# Patient Record
Sex: MALE | NOT HISPANIC OR LATINO | Employment: OTHER | URBAN - NONMETROPOLITAN AREA
[De-identification: names, ages, dates, MRNs, and addresses within clinical notes are randomized per-mention and may not be internally consistent; named-entity substitution may affect disease eponyms.]

---

## 2017-08-21 PROBLEM — I48.0 PAF (PAROXYSMAL ATRIAL FIBRILLATION): Status: ACTIVE | Noted: 2017-08-21

## 2017-08-24 ENCOUNTER — CONSULT (OUTPATIENT)
Dept: CARDIOLOGY | Facility: CLINIC | Age: 77
End: 2017-08-24

## 2017-08-24 VITALS
BODY MASS INDEX: 36.61 KG/M2 | WEIGHT: 255.7 LBS | DIASTOLIC BLOOD PRESSURE: 86 MMHG | HEIGHT: 70 IN | SYSTOLIC BLOOD PRESSURE: 130 MMHG | HEART RATE: 86 BPM

## 2017-08-24 DIAGNOSIS — I42.9 CARDIOMYOPATHY (HCC): ICD-10-CM

## 2017-08-24 DIAGNOSIS — R00.2 PALPITATIONS: Primary | ICD-10-CM

## 2017-08-24 DIAGNOSIS — I50.23 ACUTE ON CHRONIC SYSTOLIC HEART FAILURE (HCC): ICD-10-CM

## 2017-08-24 DIAGNOSIS — I48.0 PAF (PAROXYSMAL ATRIAL FIBRILLATION) (HCC): ICD-10-CM

## 2017-08-24 PROCEDURE — 99203 OFFICE O/P NEW LOW 30 MIN: CPT | Performed by: INTERNAL MEDICINE

## 2017-08-24 PROCEDURE — 93000 ELECTROCARDIOGRAM COMPLETE: CPT | Performed by: INTERNAL MEDICINE

## 2017-08-24 RX ORDER — RANOLAZINE 1000 MG/1
1000 TABLET, EXTENDED RELEASE ORAL 2 TIMES DAILY
COMMUNITY
End: 2018-01-15 | Stop reason: ALTCHOICE

## 2017-08-24 RX ORDER — GLIMEPIRIDE 2 MG/1
2 TABLET ORAL
COMMUNITY
End: 2022-02-24

## 2017-08-24 RX ORDER — FINASTERIDE 5 MG/1
5 TABLET, FILM COATED ORAL EVERY MORNING
COMMUNITY

## 2017-08-24 RX ORDER — FOLIC ACID 1 MG/1
1 TABLET ORAL EVERY MORNING
COMMUNITY
End: 2020-12-10

## 2017-08-24 RX ORDER — GABAPENTIN 800 MG/1
800 TABLET ORAL NIGHTLY
COMMUNITY
End: 2018-06-28

## 2017-08-24 RX ORDER — CARVEDILOL 6.25 MG/1
18.75 TABLET ORAL 2 TIMES DAILY WITH MEALS
Status: ON HOLD | COMMUNITY
End: 2017-09-27

## 2017-08-24 RX ORDER — WARFARIN SODIUM 6 MG/1
6 TABLET ORAL
COMMUNITY
End: 2023-03-23

## 2017-08-24 RX ORDER — AMIODARONE HYDROCHLORIDE 200 MG/1
200 TABLET ORAL DAILY
Status: ON HOLD | COMMUNITY
End: 2017-09-27 | Stop reason: ALTCHOICE

## 2017-08-24 NOTE — PROGRESS NOTES
Subjective:   Omero Holland  1940  [phone]      08/24/2017    [facility] CARDIOLOGY    No Known Provider  [facility] [address]    REFERRING DOCTOR: Dr. Melendez      Patient ID: Omero Holland is a 76 y.o. male.    Chief Complaint:   Chief Complaint   Patient presents with   • Chest Pain   • Shortness of Breath   • Irregular Heart Beat       Problem List   1. Mixed CM        A)CM dating back to 2007 EF 30% by [facility]       B)[facility] ostial CX BMS 2016        C)MPS 2/17 EF 25% Inferior ischemia        D)C LAD BMS 3/17        E)Echocardiogram EF 35% 5/17        F)Echocardiogram EF 25% 6/16/17        G)MUGA  7/25/17 EF 35%        H)Chronic class III SHF with recent admission to [facility] in setting of PAF with RVR, invitation of        Amiodarone and diuresis.  Cozaar stopped secondary to Hypotension and kidney issues    2. Hx of remote Aflutter RFA 2013   3. Frequent PVC's/LBBB   4. HTN   5. HLD   6. LOIS/CPAP   7. BPH/Elevated PSA followed by Dr. Flynn   8. Hypothyroidism              Social History     Social History   • Marital status: Unknown     Spouse name: N/A   • Number of children: N/A   • Years of education: N/A     Occupational History   • Not on file.     Social History Main Topics   • Smoking status: Never Smoker   • Smokeless tobacco: Not on file   • Alcohol use Not on file   • Drug use: Not on file   • Sexual activity: Defer     Other Topics Concern   • Not on file     Social History Narrative         Allergies   Allergen Reactions   • Codeine      Past Surgical History:   Procedure Laterality Date   • CARDIAC CATHETERIZATION Left 2007   • OTHER SURGICAL HISTORY      Melanoma removal of back    • OTHER SURGICAL HISTORY      Left axillary lumpectomy   • REPLACEMENT TOTAL KNEE BILATERAL       FAMILY HISTORY:  Coronary disease diabetes hypertension as well as atrial arrhythmias.  All is reviewed and no other significant with the patient's  history.      Current Outpatient Prescriptions:   •  amiodarone (PACERONE) 200 MG tablet, Take 200 mg by mouth Daily., Disp: , Rfl:   •  carvedilol (COREG) 6.25 MG tablet, Take 18.75 mg by mouth 2 (Two) Times a Day With Meals., Disp: , Rfl:   •  finasteride (PROSCAR) 5 MG tablet, Take 5 mg by mouth Daily., Disp: , Rfl:   •  folic acid (FOLVITE) 1 MG tablet, Take 1 mg by mouth Daily., Disp: , Rfl:   •  gabapentin (NEURONTIN) 800 MG tablet, Take 800 mg by mouth Daily., Disp: , Rfl:   •  glimepiride (AMARYL) 2 MG tablet, Take 2 mg by mouth Every Morning Before Breakfast., Disp: , Rfl:   •  metFORMIN (GLUCOPHAGE) 1000 MG tablet, Take 1,000 mg by mouth Daily With Breakfast., Disp: , Rfl:   •  methotrexate 2.5 MG tablet, Take  by mouth 3 (Three) Doses Each Week. Take Doses 12 (Twelve) Hours Apart. 8 tabs weekly, Disp: , Rfl:   •  ranolazine (RANEXA) 1000 MG 12 hr tablet, Take 1,000 mg by mouth 2 (Two) Times a Day., Disp: , Rfl:   •  warfarin (COUMADIN) 6 MG tablet, Take 6 mg by mouth Daily., Disp: , Rfl:     History of Present Illness  The patient is a pleasant 76 year old white male seen in consult for CM, SHF, LBBB, PAF, and Frequent PVC's from Dr. Chucho Melendez. He recently was admitted to [facility] for Acute on chronic SHF. He was started on Amiodarone for PAF and he converted to Sinus with frequent PVC's.  He was diuresed over twenty pounds of fluid.  He states he feels better but cannot walk more than a few feet without SOB and Fatigue.  He denies any near syncope or synocope.  He presents for consideration for BIVICD.  Seems the patient has had a decreased ejection fraction at least since February 2017 with at that time 32%.  Echocardiogram in June 2017 showed ejection fraction 25%.  And now most recently MUGA scan in July 2017 showed ejection fraction of 35%.  He is functional class III at the present time with even minimal activity causes him to be short of breath.  He's been dealing with recurrent heart failure episodes  "as well.  When he was admitted to the hospital earlier this month he came in for increasing shortness of breath and smothering feeling.  He lost about 15 pounds was just diuresis.  Also at that time was dealing with some kidney issues and saw a nephrologist.  I think the losartan was stopped due to hypotension as well as kidney issues at that time.  As noted above he was sent here for consideration for a biventricular ICD due to continued ejection fraction less than or equal to 35% since at least February 2017 on optimal medical therapy.        The following portions of the patient's history were reviewed and updated as appropriate: allergies, current medications, past family history, past medical history, past social history, past surgical history and problem list.    ROS   14 point ROS negative except as outlined in problem list, HPI and other parts of the note.      ECG 12 Lead  Date/Time: 8/24/2017 10:33 AM  Performed by: TANIA BIRCH  Authorized by: TANIA BIRCH   Rhythm: sinus rhythm  Ectopy: PVCs and atrial premature contractions  Conduction: complete LBBB and 1st degree  Comments: Sinus rhythm with a first-degree AV block and PACs and PVCs noted.  QRS duration of about 160 ms left bundle branch block.               Objective:       Vitals:    08/24/17 0937   BP: 130/86   BP Location: Right arm   Patient Position: Sitting   Pulse: 86   Weight: 255 lb 11.2 oz (116 kg)   Height: 70\" (177.8 cm)       GENERAL: Well-developed, well-nourished patient in no acute distress.  HEENT: Normocephalic, atraumatic, PERRLA.   NECK: No JVD present at 30°. No carotid bruits auscultated.  LUNGS: Clear to auscultation with decreased at the bases  CARDIOVASCULAR: IRIR. No murmurs, gallops or rubs noted.   ABDOMEN: Soft, nontender. Positive bowel sounds.  MUSCULOSKELETAL: No gross deformities. No clubbing, cyanosis, or lower extremity edema.  SKIN: Pink, warm  Neuro: Nonfocal exam. Gait intact  Ext: two plus edema  The " patient's old records including ambulatory rhythm recordings (ECGs, Holter/event monitor) were reviewed and discussed.      Lab Review:   No results found for this or any previous visit.        Diagnosis:   1. Cardiomyopathy EF 32%. Mixed.  2. PAF: recent Amiodarone therapy 8/17.Chronic Coumadin therapy.   3. Class III SHF  4. LBBB  5. PVC's    Assessment & Plan:   1.  Mixed cardiomyopathy with ejection fraction less than or equal to 30-35% since February 2017 on tolerable optimal medical therapy.  Left bundle branch block QRS duration 160 ms with functional class III symptoms.  He was recently admitted to the hospital at [place] for acute on chronic systolic heart failure and significant diuresis as needed.  Also at that time questionable worsening kidney function and losartan has been stopped due to that as well as hypotension.  Otherwise, he has been on optimal medical therapy that he is been tolerant for greater than 90 days.  2.  History of atrial flutter status post ablation in the past now with paroxysmal atrial fibrillation with recent amiodarone therapy at the hospitalization in August.  Also maintained on Coumadin.  3 PACs/PVCs.    Plan:  1.  I think patient would be best served with proceeding with a biventricular ICD with the patient's continued ejection fraction of 30-35% since February 2017 at least on tolerable optimal medical therapy.  Also with a left bundle branch block QRS duration 160 ms and functional class III symptoms.  I explained this to the patient and his wife at length and they fully understand.  2.  History of atrial flutter now with recurrent paroxysmal atrial fibrillation maintained on amiodarone and Coumadin.  We will continue these for now.  3.  I will get the lab results from his nephrologist office  and if creatinine seems to be improved with a increased blood pressure now would restart back on losartan.  They have tried Entresto in the past however the patient cannot  afford.  4.  Continue with current medical therapy and will see the patient follow-up at the time of his biventricular ICD implantation. The risks, benefits, and alternatives of the procedure have been reviewed and the patient wishes to proceed.              BOOGIE Valdes scribe for Dr. Leroy   08/24/17  10:11 AM     I, Evan Leroy DO, personally performed the services described in this documentation as scribed by the above named individual in my presence, and it is both accurate and complete.  8/24/2017  10:38 AM    Evan Leroy DO  10:38 AM  08/24/17

## 2017-09-25 ENCOUNTER — PREP FOR SURGERY (OUTPATIENT)
Dept: OTHER | Facility: HOSPITAL | Age: 77
End: 2017-09-25

## 2017-09-25 DIAGNOSIS — I42.9 CARDIOMYOPATHY (HCC): Primary | ICD-10-CM

## 2017-09-25 RX ORDER — CEFAZOLIN SODIUM 2 G/100ML
2 INJECTION, SOLUTION INTRAVENOUS
Status: CANCELLED | OUTPATIENT
Start: 2017-09-26 | End: 2017-09-27

## 2017-09-25 RX ORDER — SODIUM CHLORIDE 0.9 % (FLUSH) 0.9 %
1-10 SYRINGE (ML) INJECTION AS NEEDED
Status: CANCELLED | OUTPATIENT
Start: 2017-09-25

## 2017-09-26 ENCOUNTER — APPOINTMENT (OUTPATIENT)
Dept: PREADMISSION TESTING | Facility: HOSPITAL | Age: 77
End: 2017-09-26

## 2017-09-26 DIAGNOSIS — I42.9 CARDIOMYOPATHY (HCC): ICD-10-CM

## 2017-09-26 LAB
ANION GAP SERPL CALCULATED.3IONS-SCNC: 5 MMOL/L (ref 3–11)
BUN BLD-MCNC: 19 MG/DL (ref 9–23)
BUN/CREAT SERPL: 17.3 (ref 7–25)
CALCIUM SPEC-SCNC: 9.5 MG/DL (ref 8.7–10.4)
CHLORIDE SERPL-SCNC: 102 MMOL/L (ref 99–109)
CO2 SERPL-SCNC: 28 MMOL/L (ref 20–31)
CREAT BLD-MCNC: 1.1 MG/DL (ref 0.6–1.3)
DEPRECATED RDW RBC AUTO: 56.5 FL (ref 37–54)
ERYTHROCYTE [DISTWIDTH] IN BLOOD BY AUTOMATED COUNT: 16.1 % (ref 11.3–14.5)
GFR SERPL CREATININE-BSD FRML MDRD: 65 ML/MIN/1.73
GFR SERPL CREATININE-BSD FRML MDRD: 79 ML/MIN/1.73
GLUCOSE BLD-MCNC: 174 MG/DL (ref 70–100)
HCT VFR BLD AUTO: 39.8 % (ref 38.9–50.9)
HGB BLD-MCNC: 12.9 G/DL (ref 13.1–17.5)
INR PPP: 1.59
MAGNESIUM SERPL-MCNC: 2 MG/DL (ref 1.3–2.7)
MCH RBC QN AUTO: 31.2 PG (ref 27–31)
MCHC RBC AUTO-ENTMCNC: 32.4 G/DL (ref 32–36)
MCV RBC AUTO: 96.1 FL (ref 80–99)
PLATELET # BLD AUTO: 160 10*3/MM3 (ref 150–450)
PMV BLD AUTO: 10.5 FL (ref 6–12)
POTASSIUM BLD-SCNC: 4.1 MMOL/L (ref 3.5–5.5)
PROTHROMBIN TIME: 17.6 SECONDS (ref 9.6–11.5)
RBC # BLD AUTO: 4.14 10*6/MM3 (ref 4.2–5.76)
SODIUM BLD-SCNC: 135 MMOL/L (ref 132–146)
WBC NRBC COR # BLD: 8.04 10*3/MM3 (ref 3.5–10.8)

## 2017-09-26 PROCEDURE — 83735 ASSAY OF MAGNESIUM: CPT | Performed by: PHYSICIAN ASSISTANT

## 2017-09-26 PROCEDURE — 85610 PROTHROMBIN TIME: CPT | Performed by: PHYSICIAN ASSISTANT

## 2017-09-26 PROCEDURE — 80048 BASIC METABOLIC PNL TOTAL CA: CPT | Performed by: PHYSICIAN ASSISTANT

## 2017-09-26 PROCEDURE — 85027 COMPLETE CBC AUTOMATED: CPT | Performed by: PHYSICIAN ASSISTANT

## 2017-09-26 PROCEDURE — 36415 COLL VENOUS BLD VENIPUNCTURE: CPT

## 2017-09-26 NOTE — DISCHARGE INSTRUCTIONS
The following instructions given during Pre Admission Testing visit:    Do not eat or drink anything after MN except for sips of water with your a.m. Prescription meds unless otherwise instructed by your physician.    Glasses and jewelry may be worn, but dentures must be removed prior to cath/procedure.    Leave any items you consider valuable at home.    Family members may wait in [facility] waiting area during procedure.    Bring all medications in their original containers the day of procedure.    Bring photo ID and insurance cards on the day of procedure.    Need to make arrangements for transportation prior to discharge.    The following handouts were given:     Heart Cath pathway (if applicable)   Cardiac Cath booklet published by [facility]    OR appropriate [facility] procedure booklet    If applicable, pt instructed to bring CPAP mask and tubing the day of procedure.  ICD and chlorhexadine wipes given

## 2017-09-27 ENCOUNTER — APPOINTMENT (OUTPATIENT)
Dept: GENERAL RADIOLOGY | Facility: HOSPITAL | Age: 77
End: 2017-09-27

## 2017-09-27 ENCOUNTER — HOSPITAL ENCOUNTER (OUTPATIENT)
Facility: HOSPITAL | Age: 77
Discharge: HOME | End: 2017-09-28
Attending: INTERNAL MEDICINE | Admitting: INTERNAL MEDICINE

## 2017-09-27 DIAGNOSIS — I42.9 CARDIOMYOPATHY (HCC): ICD-10-CM

## 2017-09-27 LAB
GLUCOSE BLDC GLUCOMTR-MCNC: 138 MG/DL (ref 70–130)
GLUCOSE BLDC GLUCOMTR-MCNC: 163 MG/DL (ref 70–130)
GLUCOSE BLDC GLUCOMTR-MCNC: 181 MG/DL (ref 70–130)
GLUCOSE BLDC GLUCOMTR-MCNC: 207 MG/DL (ref 70–130)

## 2017-09-27 PROCEDURE — C1892 INTRO/SHEATH,FIXED,PEEL-AWAY: HCPCS | Performed by: INTERNAL MEDICINE

## 2017-09-27 PROCEDURE — 33225 L VENTRIC PACING LEAD ADD-ON: CPT | Performed by: INTERNAL MEDICINE

## 2017-09-27 PROCEDURE — 25010000002 FENTANYL CITRATE (PF) 100 MCG/2ML SOLUTION: Performed by: INTERNAL MEDICINE

## 2017-09-27 PROCEDURE — C1730 CATH, EP, 19 OR FEW ELECT: HCPCS | Performed by: INTERNAL MEDICINE

## 2017-09-27 PROCEDURE — 93641 EP EVL 1/2CHMB PAC CVDFB TST: CPT | Performed by: INTERNAL MEDICINE

## 2017-09-27 PROCEDURE — 82962 GLUCOSE BLOOD TEST: CPT

## 2017-09-27 PROCEDURE — C1900 LEAD, CORONARY VENOUS: HCPCS | Performed by: INTERNAL MEDICINE

## 2017-09-27 PROCEDURE — 25010000002 ONDANSETRON PER 1 MG: Performed by: INTERNAL MEDICINE

## 2017-09-27 PROCEDURE — 33249 INSJ/RPLCMT DEFIB W/LEAD(S): CPT | Performed by: INTERNAL MEDICINE

## 2017-09-27 PROCEDURE — 94799 UNLISTED PULMONARY SVC/PX: CPT

## 2017-09-27 PROCEDURE — G0378 HOSPITAL OBSERVATION PER HR: HCPCS

## 2017-09-27 PROCEDURE — C1882 AICD, OTHER THAN SING/DUAL: HCPCS | Performed by: INTERNAL MEDICINE

## 2017-09-27 PROCEDURE — 25010000002 MIDAZOLAM PER 1 MG: Performed by: INTERNAL MEDICINE

## 2017-09-27 PROCEDURE — 94660 CPAP INITIATION&MGMT: CPT

## 2017-09-27 PROCEDURE — 0 IOPAMIDOL PER 1 ML: Performed by: INTERNAL MEDICINE

## 2017-09-27 PROCEDURE — C1769 GUIDE WIRE: HCPCS | Performed by: INTERNAL MEDICINE

## 2017-09-27 PROCEDURE — 63710000001 INSULIN LISPRO (HUMAN) PER 5 UNITS: Performed by: PHYSICIAN ASSISTANT

## 2017-09-27 PROCEDURE — 71020 HC CHEST PA AND LATERAL: CPT

## 2017-09-27 PROCEDURE — 25010000003 CEFAZOLIN IN DEXTROSE 2-4 GM/100ML-% SOLUTION: Performed by: PHYSICIAN ASSISTANT

## 2017-09-27 PROCEDURE — C1898 LEAD, PMKR, OTHER THAN TRANS: HCPCS | Performed by: INTERNAL MEDICINE

## 2017-09-27 PROCEDURE — C1887 CATHETER, GUIDING: HCPCS | Performed by: INTERNAL MEDICINE

## 2017-09-27 PROCEDURE — C1777 LEAD, AICD, ENDO SINGLE COIL: HCPCS | Performed by: INTERNAL MEDICINE

## 2017-09-27 DEVICE — IMPLANTABLE DEVICE: Type: IMPLANTABLE DEVICE | Site: HEART | Status: FUNCTIONAL

## 2017-09-27 DEVICE — PACE/SENSE LEAD
Type: IMPLANTABLE DEVICE | Site: HEART | Status: FUNCTIONAL
Brand: ACUITY™ X4 SPIRAL L

## 2017-09-27 DEVICE — CARDIAC RESYNCHRONIZATION THERAPY DEFIBRILLATOR
Type: IMPLANTABLE DEVICE | Site: CHEST WALL | Status: FUNCTIONAL
Brand: INOGEN™ X4 CRT-D

## 2017-09-27 DEVICE — STEROID-ELUTING BIPOLAR PACE/SENSE AND DEFIBRILLATION LEAD
Type: IMPLANTABLE DEVICE | Site: HEART | Status: FUNCTIONAL
Brand: ENDOTAK RELIANCE® SG

## 2017-09-27 RX ORDER — CEFAZOLIN SODIUM 2 G/100ML
2 INJECTION, SOLUTION INTRAVENOUS
Status: COMPLETED | OUTPATIENT
Start: 2017-09-27 | End: 2017-09-27

## 2017-09-27 RX ORDER — TORSEMIDE 20 MG/1
50 TABLET ORAL 2 TIMES DAILY
Status: DISCONTINUED | OUTPATIENT
Start: 2017-09-27 | End: 2017-09-28 | Stop reason: HOSPADM

## 2017-09-27 RX ORDER — ONDANSETRON 2 MG/ML
INJECTION INTRAMUSCULAR; INTRAVENOUS AS NEEDED
Status: DISCONTINUED | OUTPATIENT
Start: 2017-09-27 | End: 2017-09-27 | Stop reason: HOSPADM

## 2017-09-27 RX ORDER — ONDANSETRON 2 MG/ML
4 INJECTION INTRAMUSCULAR; INTRAVENOUS EVERY 6 HOURS PRN
Status: DISCONTINUED | OUTPATIENT
Start: 2017-09-27 | End: 2017-09-28 | Stop reason: HOSPADM

## 2017-09-27 RX ORDER — SODIUM CHLORIDE 9 MG/ML
INJECTION, SOLUTION INTRAVENOUS CONTINUOUS PRN
Status: DISCONTINUED | OUTPATIENT
Start: 2017-09-27 | End: 2017-09-27 | Stop reason: HOSPADM

## 2017-09-27 RX ORDER — MIDAZOLAM HYDROCHLORIDE 1 MG/ML
INJECTION INTRAMUSCULAR; INTRAVENOUS AS NEEDED
Status: DISCONTINUED | OUTPATIENT
Start: 2017-09-27 | End: 2017-09-27 | Stop reason: HOSPADM

## 2017-09-27 RX ORDER — ACETAMINOPHEN 325 MG/1
650 TABLET ORAL EVERY 4 HOURS PRN
Status: DISCONTINUED | OUTPATIENT
Start: 2017-09-27 | End: 2017-09-28 | Stop reason: HOSPADM

## 2017-09-27 RX ORDER — WARFARIN SODIUM 3 MG/1
6 TABLET ORAL
Status: DISCONTINUED | OUTPATIENT
Start: 2017-09-28 | End: 2017-09-28 | Stop reason: HOSPADM

## 2017-09-27 RX ORDER — LIDOCAINE HYDROCHLORIDE 10 MG/ML
INJECTION, SOLUTION INFILTRATION; PERINEURAL AS NEEDED
Status: DISCONTINUED | OUTPATIENT
Start: 2017-09-27 | End: 2017-09-27 | Stop reason: HOSPADM

## 2017-09-27 RX ORDER — RANOLAZINE 500 MG/1
1000 TABLET, EXTENDED RELEASE ORAL EVERY 12 HOURS SCHEDULED
Status: DISCONTINUED | OUTPATIENT
Start: 2017-09-27 | End: 2017-09-28 | Stop reason: HOSPADM

## 2017-09-27 RX ORDER — FINASTERIDE 5 MG/1
5 TABLET, FILM COATED ORAL EVERY MORNING
Status: DISCONTINUED | OUTPATIENT
Start: 2017-09-27 | End: 2017-09-28 | Stop reason: HOSPADM

## 2017-09-27 RX ORDER — CARVEDILOL 12.5 MG/1
12.5 TABLET ORAL 2 TIMES DAILY WITH MEALS
COMMUNITY

## 2017-09-27 RX ORDER — SODIUM CHLORIDE 0.9 % (FLUSH) 0.9 %
1-10 SYRINGE (ML) INJECTION AS NEEDED
Status: DISCONTINUED | OUTPATIENT
Start: 2017-09-27 | End: 2017-09-28 | Stop reason: HOSPADM

## 2017-09-27 RX ORDER — SODIUM CHLORIDE 0.9 % (FLUSH) 0.9 %
1-10 SYRINGE (ML) INJECTION AS NEEDED
Status: DISCONTINUED | OUTPATIENT
Start: 2017-09-27 | End: 2017-09-27 | Stop reason: HOSPADM

## 2017-09-27 RX ORDER — NICOTINE POLACRILEX 4 MG
15 LOZENGE BUCCAL
Status: DISCONTINUED | OUTPATIENT
Start: 2017-09-27 | End: 2017-09-28 | Stop reason: HOSPADM

## 2017-09-27 RX ORDER — WARFARIN SODIUM 3 MG/1
6 TABLET ORAL
Status: DISCONTINUED | OUTPATIENT
Start: 2017-09-27 | End: 2017-09-27

## 2017-09-27 RX ORDER — DEXTROSE MONOHYDRATE 25 G/50ML
25 INJECTION, SOLUTION INTRAVENOUS
Status: DISCONTINUED | OUTPATIENT
Start: 2017-09-27 | End: 2017-09-28 | Stop reason: HOSPADM

## 2017-09-27 RX ORDER — TORSEMIDE 100 MG/1
50 TABLET ORAL 2 TIMES DAILY
COMMUNITY

## 2017-09-27 RX ORDER — BUPIVACAINE HYDROCHLORIDE 5 MG/ML
INJECTION, SOLUTION EPIDURAL; INTRACAUDAL AS NEEDED
Status: DISCONTINUED | OUTPATIENT
Start: 2017-09-27 | End: 2017-09-27 | Stop reason: HOSPADM

## 2017-09-27 RX ORDER — FENTANYL CITRATE 50 UG/ML
INJECTION, SOLUTION INTRAMUSCULAR; INTRAVENOUS AS NEEDED
Status: DISCONTINUED | OUTPATIENT
Start: 2017-09-27 | End: 2017-09-27 | Stop reason: HOSPADM

## 2017-09-27 RX ADMIN — INSULIN LISPRO 2 UNITS: 100 INJECTION, SOLUTION INTRAVENOUS; SUBCUTANEOUS at 20:46

## 2017-09-27 RX ADMIN — CEFAZOLIN SODIUM 2 G: 2 INJECTION, SOLUTION INTRAVENOUS at 09:11

## 2017-09-27 RX ADMIN — RANOLAZINE 1000 MG: 500 TABLET, FILM COATED, EXTENDED RELEASE ORAL at 20:46

## 2017-09-27 RX ADMIN — SACUBITRIL AND VALSARTAN 1 TABLET: 24; 26 TABLET, FILM COATED ORAL at 22:42

## 2017-09-27 RX ADMIN — TORSEMIDE 50 MG: 20 TABLET ORAL at 18:31

## 2017-09-27 RX ADMIN — CARVEDILOL 18.75 MG: 12.5 TABLET, FILM COATED ORAL at 18:31

## 2017-09-28 ENCOUNTER — APPOINTMENT (OUTPATIENT)
Dept: GENERAL RADIOLOGY | Facility: HOSPITAL | Age: 77
End: 2017-09-28

## 2017-09-28 VITALS
OXYGEN SATURATION: 99 % | TEMPERATURE: 98.8 F | WEIGHT: 262.13 LBS | SYSTOLIC BLOOD PRESSURE: 117 MMHG | RESPIRATION RATE: 20 BRPM | DIASTOLIC BLOOD PRESSURE: 85 MMHG | HEART RATE: 86 BPM | BODY MASS INDEX: 36.7 KG/M2 | HEIGHT: 71 IN

## 2017-09-28 LAB
GLUCOSE BLDC GLUCOMTR-MCNC: 177 MG/DL (ref 70–130)
INR PPP: 1.54
PROTHROMBIN TIME: 17 SECONDS (ref 9.6–11.5)

## 2017-09-28 PROCEDURE — 85610 PROTHROMBIN TIME: CPT | Performed by: PHYSICIAN ASSISTANT

## 2017-09-28 PROCEDURE — 71020 HC CHEST PA AND LATERAL: CPT

## 2017-09-28 PROCEDURE — G0378 HOSPITAL OBSERVATION PER HR: HCPCS

## 2017-09-28 PROCEDURE — 99024 POSTOP FOLLOW-UP VISIT: CPT | Performed by: INTERNAL MEDICINE

## 2017-09-28 PROCEDURE — 82962 GLUCOSE BLOOD TEST: CPT

## 2017-09-28 PROCEDURE — 93005 ELECTROCARDIOGRAM TRACING: CPT | Performed by: INTERNAL MEDICINE

## 2017-09-28 RX ADMIN — FINASTERIDE 5 MG: 5 TABLET, FILM COATED ORAL at 08:25

## 2017-09-28 RX ADMIN — RANOLAZINE 1000 MG: 500 TABLET, FILM COATED, EXTENDED RELEASE ORAL at 08:24

## 2017-09-28 RX ADMIN — INSULIN LISPRO 2 UNITS: 100 INJECTION, SOLUTION INTRAVENOUS; SUBCUTANEOUS at 08:23

## 2017-09-28 RX ADMIN — FINASTERIDE 5 MG: 5 TABLET, FILM COATED ORAL at 05:12

## 2017-09-28 RX ADMIN — CARVEDILOL 18.75 MG: 12.5 TABLET, FILM COATED ORAL at 08:24

## 2017-10-05 ENCOUNTER — OFFICE VISIT (OUTPATIENT)
Dept: CARDIOLOGY | Facility: CLINIC | Age: 77
End: 2017-10-05

## 2017-10-05 DIAGNOSIS — I42.8 OTHER CARDIOMYOPATHY (HCC): Primary | ICD-10-CM

## 2017-10-05 PROCEDURE — 99024 POSTOP FOLLOW-UP VISIT: CPT | Performed by: INTERNAL MEDICINE

## 2018-01-15 ENCOUNTER — OFFICE VISIT (OUTPATIENT)
Dept: CARDIOLOGY | Facility: CLINIC | Age: 78
End: 2018-01-15

## 2018-01-15 VITALS
HEIGHT: 69 IN | BODY MASS INDEX: 38.54 KG/M2 | WEIGHT: 260.2 LBS | DIASTOLIC BLOOD PRESSURE: 80 MMHG | SYSTOLIC BLOOD PRESSURE: 110 MMHG

## 2018-01-15 DIAGNOSIS — I50.22 CHRONIC SYSTOLIC HEART FAILURE (HCC): ICD-10-CM

## 2018-01-15 DIAGNOSIS — I10 ESSENTIAL HYPERTENSION: ICD-10-CM

## 2018-01-15 DIAGNOSIS — I48.0 PAF (PAROXYSMAL ATRIAL FIBRILLATION) (HCC): Primary | ICD-10-CM

## 2018-01-15 DIAGNOSIS — Z95.810 ICD (IMPLANTABLE CARDIOVERTER-DEFIBRILLATOR) IN PLACE: ICD-10-CM

## 2018-01-15 PROCEDURE — 99213 OFFICE O/P EST LOW 20 MIN: CPT | Performed by: INTERNAL MEDICINE

## 2018-01-15 PROCEDURE — 93284 PRGRMG EVAL IMPLANTABLE DFB: CPT | Performed by: INTERNAL MEDICINE

## 2018-01-15 RX ORDER — AMIODARONE HYDROCHLORIDE 200 MG/1
200 TABLET ORAL DAILY
COMMUNITY
End: 2018-01-15 | Stop reason: SDUPTHER

## 2018-01-15 RX ORDER — ATORVASTATIN CALCIUM 40 MG/1
40 TABLET, FILM COATED ORAL DAILY
COMMUNITY

## 2018-01-15 RX ORDER — AMIODARONE HYDROCHLORIDE 200 MG/1
TABLET ORAL
Qty: 50 TABLET | Refills: 0 | Status: SHIPPED | OUTPATIENT
Start: 2018-01-15 | End: 2021-06-24

## 2018-01-15 RX ORDER — ISOSORBIDE MONONITRATE 30 MG/1
30 TABLET, EXTENDED RELEASE ORAL DAILY
COMMUNITY
End: 2020-12-10

## 2018-01-15 NOTE — PROGRESS NOTES
Subjective:   Omero Holland  1940  [phone]      01/15/2018    [facility] CARDIOLOGY    Dimitrios Morales III, MD  [address] [address]    REFERRING DOCTOR: Dr Ayala      Patient ID: Omero Holland is a 77 y.o. male.    Chief Complaint:   Chief Complaint   Patient presents with   • Cardiomyopathy     Problem List:  1. Mixed CM        A)CM dating back to 2007 EF 30% by [facility]       B)[facility] ostial CX BMS 2016        C)MPS 2/17 EF 25% Inferior ischemia        D)C LAD BMS 3/17        E)Echocardiogram EF 35% 5/17        F)Echocardiogram EF 25% 6/16/17        G)MUGA  7/25/17 EF 35%        H)Chronic class III SHF with recent admission to [facility] in setting of PAF with RVR, invitation of Amiodarone and diuresis.  Cozaar stopped secondary to Hypotension and kidney issues       I ) s/p BIV ICD [employer]     2. Hx of remote Aflutter RFA 2013   3. Frequent PVC's/LBBB   4. HTN   5. HLD   6. LOIS/CPAP   7. BPH/Elevated PSA followed by Dr. Flynn   8. Hypothyroidism     Allergies   Allergen Reactions   • Codeine      Stomach pain       Current Outpatient Prescriptions:   •  amiodarone (PACERONE) 200 MG tablet, Take 200 mg by mouth Daily., Disp: , Rfl:   •  atorvastatin (LIPITOR) 40 MG tablet, Take 40 mg by mouth Daily., Disp: , Rfl:   •  carvedilol (COREG) 12.5 MG tablet, Take 18.75 mg by mouth 2 (Two) Times a Day With Meals., Disp: , Rfl:   •  finasteride (PROSCAR) 5 MG tablet, Take 5 mg by mouth Every Morning., Disp: , Rfl:   •  folic acid (FOLVITE) 1 MG tablet, Take 1 mg by mouth Every Morning., Disp: , Rfl:   •  gabapentin (NEURONTIN) 800 MG tablet, Take 800 mg by mouth Every Night., Disp: , Rfl:   •  glimepiride (AMARYL) 2 MG tablet, Take 2 mg by mouth Every Morning Before Breakfast., Disp: , Rfl:   •  isosorbide mononitrate (IMDUR) 30 MG 24 hr tablet, Take 30 mg by mouth Daily., Disp: , Rfl:   •  metFORMIN (GLUCOPHAGE) 1000 MG tablet, Take 1,000 mg by mouth  "Daily With Breakfast., Disp: , Rfl:   •  METHOTREXATE, ANTI-RHEUMATIC, PO, Take 2.5 mg by mouth 1 (One) Time Per Week. 8 tabs every Monday, Disp: , Rfl:   •  POTASSIUM CHLORIDE PO, Take 20 mEq by mouth 3 (Three) Times a Day., Disp: , Rfl:   •  torsemide (DEMADEX) 100 MG tablet, Take 50 mg by mouth 2 (Two) Times a Day., Disp: , Rfl:   •  warfarin (COUMADIN) 6 MG tablet, Take 6 mg by mouth Daily., Disp: , Rfl:     History of Present Illness  Patient here today for follow up with his Mixed CMP s/p BiV ICD in Sept 2017 PVCs and also increasing SOB. Following with nephrology for some issues with his kidney but now Cr 1.10. Overall still with sig SOB with any activity and also fatigue. Also dealing with LE swelling that is being followed as well. Never smoked. Occ with chest pain but more with fluid retention. Following with Dr Ayala.     No issues with  fevers, chills, night sweats, PND, orthopnea or palpitations. No recent ER visits or hospital stays.      The following portions of the patient's history were reviewed and updated as appropriate: allergies, current medications, past family history, past medical history, past social history, past surgical history and problem list.    ROS   14 point ROS negative except as outlined in problem list, HPI and other parts of the note.      ECG 12 Lead  Date/Time: 1/15/2018 3:35 PM  Performed by: TANIA BIRCH  Authorized by: TANIA BIRCH   Rhythm: sinus rhythm and paced  Comments: NSR BiV paced with Trigeminy PVCs mostly one morphology. RB morphology with superior axis.                Objective:       Vitals:    01/15/18 1516   BP: 110/80   BP Location: Left arm   Patient Position: Sitting   Weight: 118 kg (260 lb 3.2 oz)   Height: 174 cm (68.5\")       GENERAL: Well-developed, well-nourished patient in no acute distress.  HEENT: Normocephalic, atraumatic, PERRLA. Moist mucous membranes.  NECK: No JVD present at 30°. No carotid bruits auscultated.  LUNGS: Clear to " auscultation.  CARDIOVASCULAR: Heart has a regular rate and rhythm. + SNEHA noted. Ectopy  ABDOMEN: Soft, nontender. Positive bowel sounds.  MUSCULOSKELETAL: No gross deformities. No clubbing, cyanosis, or lower extremity edema.  SKIN: Pink, warm  Neuro: Nonfocal exam.   Ext:chronic swelling noted. 2+ > on right. Sores wrapped.   ICD site ok    The patient's old records including ambulatory rhythm recordings (ECGs, Holter/event monitor) were reviewed and discussed.      Lab Review:   Results for orders placed or performed during the hospital encounter of 09/27/17   Protime-INR   Result Value Ref Range    Protime 17.0 (H) 9.6 - 11.5 Seconds    INR 1.54    POC Glucose Fingerstick   Result Value Ref Range    Glucose 181 (H) 70 - 130 mg/dL   POC Glucose Fingerstick   Result Value Ref Range    Glucose 138 (H) 70 - 130 mg/dL   POC Glucose Fingerstick   Result Value Ref Range    Glucose 207 (H) 70 - 130 mg/dL   POC Glucose Fingerstick   Result Value Ref Range    Glucose 163 (H) 70 - 130 mg/dL   POC Glucose Fingerstick   Result Value Ref Range    Glucose 177 (H) 70 - 130 mg/dL     [employer] bi-V ICD.  A paced bi-V paced.  83% RA paced.  88% bi-V paced.  P wave 2.9 mV.  R waves 10.7 mV.  RA and RV lead threshold impedances noted.  RV lead threshold was increased to 3.5 V at 0.5 ms.  R waves are 10.7 mV.  LV lead threshold 1.5 old at 0.5 ms.  LV impedance 984 ohms.  6+ years left on battery.  RV output increased and RA output decreased.  LV output decreased as well.  No other significant changes made.  150,000 PVCs.        Diagnosis:   1. PAF (paroxysmal atrial fibrillation)  2. Chronic systolic heart failure  3. Essential hypertension  4. BiV ICD      Assessment & Plan:   1. Mixed cardiomyopathy w/ EF 30-35% since 2/2017 despite optimal rx, QRS > 150 msec and FC III s/p BiV ICD [employer] in Sept 2017  2. Remote Aflutter RFA -- in NSR now.   3. HTN stable.   4. BiV ICD with 88% biventricular paced.  Increased RV lead  threshold however still captures at higher thresholds and good sensing noted.  We'll continue monitoring RV lead closely.   5. PVCs noted with RBBB morphology and superior axis. Limited on what we can treat with due to his history and also with some issues with his kidneys in the past.     Plan:  1. Started on Amiodarone by Dr Ayala but not loaded. I want him to take 200 mg TID for 5 days then 200 mg BID for 10 days then 200 mg daily. Monitor liver, eyes, thyroid and lungs closely.   2. Watch RV lead closely and now will be biV paced  3. Continue on current optimal therapy, and continuing to follow for LE wound care issues. Diuresis per Dr Ayala's office.   4. Due to multiple comorbidities will be a higher risk for a PVC ablation and ideally would want to try medical therapy first and only as last resort consider Ep study , RFA.   5. Follow up in 3 mths with me in [place].         CC: Dr Jamie Leroy,   01/15/18  3:32 PM      EMR Dragon/Transcription disclaimer:  Much of this encounter note is an electronic transcription/translation of spoken language to printed text. Electronic translation of spoken language may permit erroneous, or at times, nonsensical words or phrases to be inadvertently transcribed. Although I have reviewed the note for such errors, some may still exist.

## 2018-06-28 ENCOUNTER — OFFICE VISIT (OUTPATIENT)
Dept: CARDIOLOGY | Facility: CLINIC | Age: 78
End: 2018-06-28

## 2018-06-28 VITALS
HEART RATE: 83 BPM | WEIGHT: 240 LBS | SYSTOLIC BLOOD PRESSURE: 122 MMHG | BODY MASS INDEX: 34.36 KG/M2 | DIASTOLIC BLOOD PRESSURE: 84 MMHG | HEIGHT: 70 IN

## 2018-06-28 DIAGNOSIS — I48.0 PAF (PAROXYSMAL ATRIAL FIBRILLATION) (HCC): Primary | ICD-10-CM

## 2018-06-28 DIAGNOSIS — I42.8 OTHER CARDIOMYOPATHY (HCC): ICD-10-CM

## 2018-06-28 DIAGNOSIS — I49.3 PVC (PREMATURE VENTRICULAR CONTRACTION): ICD-10-CM

## 2018-06-28 PROCEDURE — 93284 PRGRMG EVAL IMPLANTABLE DFB: CPT | Performed by: NURSE PRACTITIONER

## 2018-06-28 PROCEDURE — 99213 OFFICE O/P EST LOW 20 MIN: CPT | Performed by: NURSE PRACTITIONER

## 2018-06-28 RX ORDER — METOLAZONE 2.5 MG/1
2.5 TABLET ORAL AS NEEDED
COMMUNITY
End: 2022-02-24

## 2018-06-28 RX ORDER — ALLOPURINOL 100 MG/1
100 TABLET ORAL DAILY
COMMUNITY
End: 2022-02-24

## 2018-06-28 NOTE — PROGRESS NOTES
Subjective:   Omero Holland  1940  [phone]    01/15/2018    [facility] CARDIOLOGY    Dimitiros Morales III, MD  [address] [address]    REFERRING DOCTOR: Dr Ayala      Patient ID: Omero Holland is a 77 y.o. male.    Chief Complaint:   Chief Complaint   Patient presents with   • Shortness of Breath     Problem List:  1. Mixed CM        A)CM dating back to 2007 EF 30% by [facility]       B)[facility] ostial CX BMS 2016        C)MPS 2/17 EF 25% Inferior ischemia        D)C LAD BMS 3/17        E)Echocardiogram EF 35% 5/17        F)Echocardiogram EF 25% 6/16/17        G)MUGA  7/25/17 EF 35%        H)Chronic class III SHF with admission to [facility] in setting of PAF with RVR, invitation of Amiodarone and diuresis.  Cozaar stopped secondary to Hypotension and kidney issues       I ) s/p BIV ICD [employer]     2. Hx of remote Aflutter RFA 2013   3. Frequent PVC's/LBBB   4. HTN   5. HLD   6. LOIS/CPAP   7. BPH/Elevated PSA followed by Dr. Flynn   8. Hypothyroidism   9. Atrial fibrillation   A. Started on amiodarone   B. CHADSVASC = , on coumadin    Allergies   Allergen Reactions   • Codeine      Stomach pain       Current Outpatient Prescriptions:   •  allopurinol (ZYLOPRIM) 100 MG tablet, Take 100 mg by mouth Daily., Disp: , Rfl:   •  amiodarone (PACERONE) 200 MG tablet, TAKE 1 TABLET BY MOUTH 3 TIMES A DAY FOR 5 DAYS, THEN 2 TIMES A DAY FOR 10 DAYS, THEN ONCE DAILY, Disp: 50 tablet, Rfl: 0  •  atorvastatin (LIPITOR) 40 MG tablet, Take 40 mg by mouth Daily., Disp: , Rfl:   •  carvedilol (COREG) 12.5 MG tablet, Take 18.75 mg by mouth 2 (Two) Times a Day With Meals., Disp: , Rfl:   •  finasteride (PROSCAR) 5 MG tablet, Take 5 mg by mouth Every Morning., Disp: , Rfl:   •  folic acid (FOLVITE) 1 MG tablet, Take 1 mg by mouth Every Morning., Disp: , Rfl:   •  glimepiride (AMARYL) 2 MG tablet, Take 2 mg by mouth Every Morning Before Breakfast., Disp: , Rfl:   •  isosorbide  "mononitrate (IMDUR) 30 MG 24 hr tablet, Take 30 mg by mouth Daily., Disp: , Rfl:   •  metFORMIN (GLUCOPHAGE) 1000 MG tablet, Take 1,000 mg by mouth Daily With Breakfast., Disp: , Rfl:   •  metOLazone (ZAROXOLYN) 2.5 MG tablet, Take 2.5 mg by mouth Daily., Disp: , Rfl:   •  POTASSIUM CHLORIDE PO, Take 20 mEq by mouth 3 (Three) Times a Day., Disp: , Rfl:   •  torsemide (DEMADEX) 100 MG tablet, Take 50 mg by mouth 2 (Two) Times a Day., Disp: , Rfl:   •  warfarin (COUMADIN) 6 MG tablet, Take 6 mg by mouth Daily. 6 mg every day and 3 on thurs., Disp: , Rfl:     History of Present Illness: Mr. Holland is a 78 y/o male with the above noted past medical history who presents today for follow up on mixed diastolic/systolic congestive heart failure, s/p BiV ICD September 2017, PVCs and h/o PAF with RVR. He is currently on optimal medical therapy with Coreg. Not on ACE/ARB s/t CKD. Followed by Dr. Melendez. Recent echo but dose not know results. He is anticoagulated with coumadin which is brianda  Managed by Dr Melendez. Rate controlled with coreg and on Amiodarone for Afib and PVCs. PVC burden reduced on today's device check as well as increased BiV pacing. No issues with  fevers, chills, night sweats, PND, orthopnea or palpitations. No recent ER visits or hospital stays. Chronic SOB which is unchanged.       The following portions of the patient's history were reviewed and updated as appropriate: allergies, current medications, past family history, past medical history, past social history, past surgical history and problem list.    Review of Systems   Respiratory: Positive for shortness of breath.       14 point ROS negative except as outlined in problem list, HPI and other parts of the note.    Procedures       Objective:       Vitals:    06/28/18 0936   BP: 122/84   BP Location: Right arm   Patient Position: Sitting   Pulse: 83   Weight: 109 kg (240 lb)   Height: 177.8 cm (70\")       GENERAL: Obese patient in no acute " distress.  HEENT: Normocephalic, atraumatic, PERRLA. Moist mucous membranes.  NECK: No JVD present at 30°. No carotid bruits auscultated.  LUNGS: Clear to auscultation.  CARDIOVASCULAR: Heart has a regular rate and rhythm. + 2/6 SNEHA noted.   ABDOMEN: Soft, nontender. Positive bowel sounds.  MUSCULOSKELETAL: No gross deformities. No clubbing, cyanosis.  SKIN: Pink, warm. ICD site without issue.   Neuro: Nonfocal exam.   Ext: 1+ pitting edema bilaterally.       The patient's old records including ambulatory rhythm recordings (ECGs, Holter/event monitor) were reviewed and discussed.      Lab Review:   Results for orders placed or performed during the hospital encounter of 09/27/17   Protime-INR   Result Value Ref Range    Protime 17.0 (H) 9.6 - 11.5 Seconds    INR 1.54    POC Glucose Fingerstick   Result Value Ref Range    Glucose 181 (H) 70 - 130 mg/dL   POC Glucose Fingerstick   Result Value Ref Range    Glucose 138 (H) 70 - 130 mg/dL   POC Glucose Fingerstick   Result Value Ref Range    Glucose 207 (H) 70 - 130 mg/dL   POC Glucose Fingerstick   Result Value Ref Range    Glucose 163 (H) 70 - 130 mg/dL   POC Glucose Fingerstick   Result Value Ref Range    Glucose 177 (H) 70 - 130 mg/dL     Device check 6/28/18: BiV ICD, BV pacing 92%,RA pced 95%, PVC burden 24K, no AMS, no events. Normal threshold and impedance. RV lead output decreased to save battery to 2.6v. Battery 8 years.         Diagnosis:   1. PAF (paroxysmal atrial fibrillation)  2. Chronic systolic heart failure  3. Essential hypertension  4. BiV ICD      Assessment & Plan:   1. Mixed cardiomyopathy w/ EF 30-35% since 2/2017 despite optimal tolerate rx, not on ACE/ARB s/t CKD,I s/p BiV ICD [employer] in Sept 2017 with normal function today. NYHA class II symptoms.   2. Remote Aflutter RFA - no AMS on today's device check .    3. HTN, controlled  4. PVCs noted with RBBB morphology and superior axis. Limited on what we can treat with due to his history and also  with some issues with his kidneys in the past. Due to multiple comorbidities will be a higher risk for a PVC ablation and ideally would want to try medical therapy first and only as last resort consider Ep study , RFA. PVC burden reduced from 150K to 24K with an increase in BiV Pacing to 92% from 83%.    Plan:  1. Continue Amiodarone 200 mg daily. Monitor liver, eyes, thyroid and lungs closely.   2. Watch RV lead closely and now will remain BiV paced  3. Continue on current optimal therapy  4. Keep scheduled follow up with Dr. Ayala  5. Follow up in 6 months.         CC: Dr Jamie Roman, APRN  06/28/18  9:50 AM

## 2018-08-16 ENCOUNTER — CLINICAL SUPPORT NO REQUIREMENTS (OUTPATIENT)
Dept: CARDIOLOGY | Facility: CLINIC | Age: 78
End: 2018-08-16

## 2018-08-16 DIAGNOSIS — I42.8 OTHER CARDIOMYOPATHY (HCC): ICD-10-CM

## 2018-08-16 DIAGNOSIS — I48.0 PAF (PAROXYSMAL ATRIAL FIBRILLATION) (HCC): ICD-10-CM

## 2018-08-16 PROCEDURE — 93295 DEV INTERROG REMOTE 1/2/MLT: CPT | Performed by: INTERNAL MEDICINE

## 2018-08-16 PROCEDURE — 93296 REM INTERROG EVL PM/IDS: CPT | Performed by: INTERNAL MEDICINE

## 2018-11-15 ENCOUNTER — CLINICAL SUPPORT NO REQUIREMENTS (OUTPATIENT)
Dept: CARDIOLOGY | Facility: CLINIC | Age: 78
End: 2018-11-15

## 2018-11-15 DIAGNOSIS — I50.22 CHRONIC SYSTOLIC HEART FAILURE (HCC): ICD-10-CM

## 2018-11-15 DIAGNOSIS — I48.0 PAF (PAROXYSMAL ATRIAL FIBRILLATION) (HCC): Primary | ICD-10-CM

## 2018-12-27 ENCOUNTER — CLINICAL SUPPORT NO REQUIREMENTS (OUTPATIENT)
Dept: CARDIOLOGY | Facility: CLINIC | Age: 78
End: 2018-12-27

## 2018-12-27 DIAGNOSIS — I48.0 PAF (PAROXYSMAL ATRIAL FIBRILLATION) (HCC): Primary | ICD-10-CM

## 2018-12-27 DIAGNOSIS — I50.22 CHRONIC SYSTOLIC HEART FAILURE (HCC): ICD-10-CM

## 2018-12-27 DIAGNOSIS — I42.8 OTHER CARDIOMYOPATHY (HCC): ICD-10-CM

## 2018-12-27 PROCEDURE — 93296 REM INTERROG EVL PM/IDS: CPT | Performed by: INTERNAL MEDICINE

## 2018-12-27 PROCEDURE — 93295 DEV INTERROG REMOTE 1/2/MLT: CPT | Performed by: INTERNAL MEDICINE

## 2019-01-24 ENCOUNTER — OFFICE VISIT (OUTPATIENT)
Dept: CARDIOLOGY | Facility: CLINIC | Age: 79
End: 2019-01-24

## 2019-01-24 VITALS
BODY MASS INDEX: 48.1 KG/M2 | HEART RATE: 86 BPM | SYSTOLIC BLOOD PRESSURE: 136 MMHG | WEIGHT: 245 LBS | HEIGHT: 60 IN | DIASTOLIC BLOOD PRESSURE: 76 MMHG

## 2019-01-24 DIAGNOSIS — I49.3 PVC (PREMATURE VENTRICULAR CONTRACTION): ICD-10-CM

## 2019-01-24 DIAGNOSIS — I48.0 PAF (PAROXYSMAL ATRIAL FIBRILLATION) (HCC): Primary | ICD-10-CM

## 2019-01-24 DIAGNOSIS — I50.22 CHRONIC SYSTOLIC HEART FAILURE (HCC): ICD-10-CM

## 2019-01-24 PROCEDURE — 99214 OFFICE O/P EST MOD 30 MIN: CPT | Performed by: PHYSICIAN ASSISTANT

## 2019-01-24 PROCEDURE — 93289 INTERROG DEVICE EVAL HEART: CPT | Performed by: PHYSICIAN ASSISTANT

## 2019-01-24 NOTE — PROGRESS NOTES
Omero Holland  1940  PCP: Dimitrios Morales III, MD    SUBJECTIVE:   Omero Holland is a 78 y.o. male seen for a follow up visit regarding the following:     Chief Complaint: Follow up for chronic systolic heart failure, PAF, PVCs    HPI:    Since last visit the patient's status has been stable. He reports occasionally feeling SOB when he notices his weight increasing. He then takes a metolazone and has improvement. Overall he has been stable from cardiac standpoint. No CP, palps, dizziness, ICD shocks.       Cardiac PMH: (Old records have been reviewed and summarized below)    Problem List:  1. Mixed CM        A)CM dating back to 2007 EF 30% by [facility]       B)[facility] ostial CX BMS 2016        C)MPS 2/17 EF 25% Inferior ischemia        D)C LAD BMS 3/17        E)Echocardiogram EF 35% 5/17        F)Echocardiogram EF 25% 6/16/17        G)MUGA  7/25/17 EF 35%        H)Chronic class III SHF with admission to [facility] in setting of PAF with RVR, invitation of Amiodarone and diuresis.  Cozaar stopped secondary to Hypotension and kidney issues       I ) s/p BIV ICD [employer]     2. Hx of remote Aflutter RFA 2013   3. Frequent PVC's/LBBB   4. HTN   5. HLD   6. LOIS/CPAP   7. BPH/Elevated PSA followed by Dr. Flynn   8. Hypothyroidism   9. Atrial fibrillation              A. Started on amiodarone              B. CHADSVASC = , on coumadin            Current Outpatient Medications:   •  allopurinol (ZYLOPRIM) 100 MG tablet, Take 100 mg by mouth Daily., Disp: , Rfl:   •  amiodarone (PACERONE) 200 MG tablet, TAKE 1 TABLET BY MOUTH 3 TIMES A DAY FOR 5 DAYS, THEN 2 TIMES A DAY FOR 10 DAYS, THEN ONCE DAILY, Disp: 50 tablet, Rfl: 0  •  atorvastatin (LIPITOR) 40 MG tablet, Take 40 mg by mouth Daily., Disp: , Rfl:   •  carvedilol (COREG) 12.5 MG tablet, Take 18.75 mg by mouth 2 (Two) Times a Day With Meals., Disp: , Rfl:   •  finasteride (PROSCAR) 5 MG tablet, Take 5 mg by mouth Every Morning., Disp: , Rfl:   •   folic acid (FOLVITE) 1 MG tablet, Take 1 mg by mouth Every Morning., Disp: , Rfl:   •  glimepiride (AMARYL) 2 MG tablet, Take 2 mg by mouth Every Morning Before Breakfast., Disp: , Rfl:   •  isosorbide mononitrate (IMDUR) 30 MG 24 hr tablet, Take 30 mg by mouth Daily., Disp: , Rfl:   •  metFORMIN (GLUCOPHAGE) 1000 MG tablet, Take 1,000 mg by mouth Daily With Breakfast., Disp: , Rfl:   •  metOLazone (ZAROXOLYN) 2.5 MG tablet, Take 2.5 mg by mouth Daily., Disp: , Rfl:   •  POTASSIUM CHLORIDE PO, Take 20 mEq by mouth 3 (Three) Times a Day., Disp: , Rfl:   •  torsemide (DEMADEX) 100 MG tablet, Take 50 mg by mouth 2 (Two) Times a Day., Disp: , Rfl:   •  warfarin (COUMADIN) 6 MG tablet, Take 6 mg by mouth Daily. 6 mg every day and 3 on thurs., Disp: , Rfl:     Past Medical History, Past Surgical History, Family history, Social History, and Medications were all reviewed with the patient today and updated as necessary.       Patient Active Problem List   Diagnosis   • Palpitations   • Cardiomyopathy (CMS/HCC)   • Hypertension   • Dyslipidemia   • Systolic heart failure (CMS/HCC)   • Psoriatic arthritis (CMS/HCC)   • Obesity   • LOIS (obstructive sleep apnea)   • PAF (paroxysmal atrial fibrillation) (CMS/HCC)   • PVC (premature ventricular contraction)     Allergies   Allergen Reactions   • Codeine      Stomach pain     Past Medical History:   Diagnosis Date   • A-fib (CMS/HCC)    • Arthritis    • Cardiomyopathy (CMS/HCC)     Non-ischemic   • Coronary artery disease     2 stents   • Diabetes mellitus (CMS/HCC)     dx 3- 4 years ago- checks fsbs daily   • Dizziness    • Dyslipidemia    • Hypertension     H/o   • Kidney disease     HX- sees a nephrologist   • Melanoma (CMS/HCC)    • Obesity    • LOIS (obstructive sleep apnea)     cpap   • PAF (paroxysmal atrial fibrillation) (CMS/HCC) 8/21/2017    /atrial flutter   • Palpitations     Tachy Palpitations   • Psoriatic arthritis (CMS/HCC)    • Systolic heart failure (CMS/HCC)      "Chronic class II   • Wears glasses     reading     Past Surgical History:   Procedure Laterality Date   • CARDIAC ABLATION     • CARDIAC CATHETERIZATION Left 2007    2 stents   • CARDIAC ELECTROPHYSIOLOGY PROCEDURE N/A 9/27/2017    Procedure: Implant ICD - bi ventricular;  Surgeon: Evan Leroy DO;  Location: [facility] INVASIVE LOCATION;  Service:    • COLONOSCOPY      > 10 years ago   • LIVER BIOPSY      x 3   • OTHER SURGICAL HISTORY      Melanoma removal of back    • REPLACEMENT TOTAL KNEE BILATERAL Bilateral      Family History   Problem Relation Age of Onset   • Cancer Mother    • Heart attack Father      Social History     Tobacco Use   • Smoking status: Never Smoker   • Smokeless tobacco: Never Used   Substance Use Topics   • Alcohol use: No         PHYSICAL EXAM:    /76 (BP Location: Left arm, Patient Position: Sitting)   Pulse 86   Ht 70 cm (27.56\")   Wt 111 kg (245 lb)   .80 kg/m²        Wt Readings from Last 5 Encounters:   01/24/19 111 kg (245 lb)   06/28/18 109 kg (240 lb)   01/15/18 118 kg (260 lb 3.2 oz)   09/27/17 119 kg (262 lb 2 oz)   08/24/17 116 kg (255 lb 11.2 oz)       BP Readings from Last 5 Encounters:   01/24/19 136/76   06/28/18 122/84   01/15/18 110/80   09/28/17 117/85   08/24/17 130/86       General-Well Nourished, Well developed elderly male in NAD   Eyes - PERRLA  Neck- supple, No mass  CV- regular rate and rhythm, no MRG, trace pedal edema   Lung- clear bilaterally  Abd- soft, +BS  Musc/skel - Norm strength and range of motion  Skin- warm and dry  Neuro - Alert & Oriented x 3, appropriate mood.        Medical problems and test results were reviewed with the patient today.     No results found for this or any previous visit (from the past 672 hour(s)).      EKG: (EKG has been independently visualized by me and summarized below)    Procedures     Device: normal functioning dual chamber icd. Mode switched <1%; longest 11.3 hours. BIV pacing 92%     ASSESSMENT and " PLAN    1. Chronic Systolic Heart Failure- LVEF 30-35% s/p BIV ICD with normal function. NYHA Class II symptoms. No ACE/ARB secondary to CKD. Continue Coreg. Follow-up with Dr. Ayala.     2. PAF/Flutter s/p RFA. Mode switched < 1%; continue Coumadin      3. PVCs- Last PVC burden was 24K following initiation of PO Amiodarone. PVC burden 9300 today. Continue Amio 200mg daily. Monitor LFTs, Thyroid, and lungs.       Return in about 6 months (around 7/24/2019).        Gail Reddy PA-C  Cardiology/Electrophysiology  1/24/2019  1:48 PM

## 2019-03-28 ENCOUNTER — CLINICAL SUPPORT NO REQUIREMENTS (OUTPATIENT)
Dept: CARDIOLOGY | Facility: CLINIC | Age: 79
End: 2019-03-28

## 2019-03-28 DIAGNOSIS — I42.9 CARDIOMYOPATHY, UNSPECIFIED TYPE (HCC): ICD-10-CM

## 2019-03-28 PROCEDURE — 93296 REM INTERROG EVL PM/IDS: CPT | Performed by: INTERNAL MEDICINE

## 2019-03-28 PROCEDURE — 93295 DEV INTERROG REMOTE 1/2/MLT: CPT | Performed by: INTERNAL MEDICINE

## 2019-06-27 ENCOUNTER — CLINICAL SUPPORT NO REQUIREMENTS (OUTPATIENT)
Dept: CARDIOLOGY | Facility: CLINIC | Age: 79
End: 2019-06-27

## 2019-06-27 DIAGNOSIS — I48.0 PAF (PAROXYSMAL ATRIAL FIBRILLATION) (HCC): ICD-10-CM

## 2019-06-27 DIAGNOSIS — I50.22 CHRONIC SYSTOLIC HEART FAILURE (HCC): ICD-10-CM

## 2019-06-27 PROCEDURE — 93296 REM INTERROG EVL PM/IDS: CPT | Performed by: INTERNAL MEDICINE

## 2019-06-27 PROCEDURE — 93295 DEV INTERROG REMOTE 1/2/MLT: CPT | Performed by: INTERNAL MEDICINE

## 2019-08-01 ENCOUNTER — OFFICE VISIT (OUTPATIENT)
Dept: CARDIOLOGY | Facility: CLINIC | Age: 79
End: 2019-08-01

## 2019-08-01 VITALS
SYSTOLIC BLOOD PRESSURE: 115 MMHG | HEIGHT: 70 IN | WEIGHT: 245 LBS | HEART RATE: 75 BPM | BODY MASS INDEX: 35.07 KG/M2 | DIASTOLIC BLOOD PRESSURE: 60 MMHG

## 2019-08-01 DIAGNOSIS — I49.3 PVC (PREMATURE VENTRICULAR CONTRACTION): ICD-10-CM

## 2019-08-01 DIAGNOSIS — Z79.899 LONG TERM CURRENT USE OF ANTIARRHYTHMIC MEDICAL THERAPY: ICD-10-CM

## 2019-08-01 DIAGNOSIS — I50.22 CHRONIC SYSTOLIC CONGESTIVE HEART FAILURE (HCC): Primary | ICD-10-CM

## 2019-08-01 PROCEDURE — 99214 OFFICE O/P EST MOD 30 MIN: CPT | Performed by: INTERNAL MEDICINE

## 2019-08-01 PROCEDURE — 93284 PRGRMG EVAL IMPLANTABLE DFB: CPT | Performed by: INTERNAL MEDICINE

## 2019-08-01 RX ORDER — CALCIUM CARBONATE/VITAMIN D3 500-10/5ML
400 LIQUID (ML) ORAL 2 TIMES DAILY
Status: ON HOLD | COMMUNITY
End: 2019-12-12

## 2019-08-01 NOTE — PROGRESS NOTES
Omero Holland  1940  PCP: Dimitrios Morales III, MD    SUBJECTIVE:   Omero Holland is a 78 y.o. male seen for a follow up visit regarding the following:     Chief Complaint: Follow up for chronic systolic heart failure, PAF, PVCs    HPI:    Since last visit the patient's status has been stable. He reports occasionally feeling SOB. No CP, palps, dizziness, ICD shocks. Periods of fluid increase that he uses extra Demadex      Cardiac PMH: (Old records have been reviewed and summarized below)    Problem List:  1. Mixed CM        A)CM dating back to 2007 EF 30% by [facility]       B)[facility] ostial CX BMS 2016        C)MPS 2/17 EF 25% Inferior ischemia        D)C LAD BMS 3/17        E)Echocardiogram EF 35% 5/17        F)Echocardiogram EF 25% 6/16/17        G)MUGA  7/25/17 EF 35%        H)Chronic class III SHF with admission to [facility] in setting of PAF with RVR, invitation of Amiodarone and diuresis.  Cozaar stopped secondary to Hypotension and kidney issues       I ) s/p BIV ICD [employer]     2. Hx of remote Aflutter RFA 2013   3. Frequent PVC's/LBBB   4. HTN   5. HLD   6. LOIS/CPAP   7. BPH/Elevated PSA followed by Dr. Flynn   8. Hypothyroidism   9. Atrial fibrillation              A. Started on amiodarone              B. CHADSVASC = , on coumadin            Current Outpatient Medications:   •  allopurinol (ZYLOPRIM) 100 MG tablet, Take 100 mg by mouth Daily., Disp: , Rfl:   •  amiodarone (PACERONE) 200 MG tablet, TAKE 1 TABLET BY MOUTH 3 TIMES A DAY FOR 5 DAYS, THEN 2 TIMES A DAY FOR 10 DAYS, THEN ONCE DAILY (Patient taking differently: 200 mg Daily. TAKE 1 TABLET BY MOUTH 3 TIMES A DAY FOR 5 DAYS, THEN 2 TIMES A DAY FOR 10 DAYS, THEN ONCE DAILY), Disp: 50 tablet, Rfl: 0  •  atorvastatin (LIPITOR) 40 MG tablet, Take 40 mg by mouth Daily., Disp: , Rfl:   •  carvedilol (COREG) 12.5 MG tablet, Take 18.75 mg by mouth 2 (Two) Times a Day With Meals., Disp: , Rfl:   •  finasteride (PROSCAR) 5 MG tablet,  Take 5 mg by mouth Every Morning., Disp: , Rfl:   •  folic acid (FOLVITE) 1 MG tablet, Take 1 mg by mouth Every Morning., Disp: , Rfl:   •  glimepiride (AMARYL) 2 MG tablet, Take 2 mg by mouth Every Morning Before Breakfast., Disp: , Rfl:   •  isosorbide mononitrate (IMDUR) 30 MG 24 hr tablet, Take 30 mg by mouth Daily., Disp: , Rfl:   •  Magnesium Oxide 400 MG capsule, Take 400 mg by mouth 2 (Two) Times a Day., Disp: , Rfl:   •  metFORMIN (GLUCOPHAGE) 1000 MG tablet, Take 1,000 mg by mouth Daily With Breakfast., Disp: , Rfl:   •  methotrexate 2.5 MG tablet, Take  by mouth 1 (One) Time Per Week., Disp: , Rfl:   •  metOLazone (ZAROXOLYN) 2.5 MG tablet, Take 2.5 mg by mouth Daily., Disp: , Rfl:   •  POTASSIUM CHLORIDE PO, Take 20 mEq by mouth 3 (Three) Times a Day., Disp: , Rfl:   •  torsemide (DEMADEX) 100 MG tablet, Take 50 mg by mouth 2 (Two) Times a Day., Disp: , Rfl:   •  warfarin (COUMADIN) 6 MG tablet, Take 6 mg by mouth Daily. 6 mg every day and 3 on thurs., Disp: , Rfl:     Past Medical History, Past Surgical History, Family history, Social History, and Medications were all reviewed with the patient today and updated as necessary.       Patient Active Problem List   Diagnosis   • Palpitations   • Cardiomyopathy (CMS/HCC)   • Hypertension   • Dyslipidemia   • Chronic systolic congestive heart failure (CMS/HCC)   • Psoriatic arthritis (CMS/HCC)   • Obesity   • LOIS (obstructive sleep apnea)   • PAF (paroxysmal atrial fibrillation) (CMS/HCC)   • PVC (premature ventricular contraction)   • Long term current use of antiarrhythmic medical therapy     Allergies   Allergen Reactions   • Codeine      Stomach pain     Past Medical History:   Diagnosis Date   • A-fib (CMS/HCC)    • Arthritis    • Cardiomyopathy (CMS/HCC)     Non-ischemic   • Coronary artery disease     2 stents   • Diabetes mellitus (CMS/HCC)     dx 3- 4 years ago- checks fsbs daily   • Dizziness    • Dyslipidemia    • Hypertension     H/o   • Kidney  "disease     HX- sees a nephrologist   • Melanoma (CMS/HCC)    • Obesity    • LOIS (obstructive sleep apnea)     cpap   • PAF (paroxysmal atrial fibrillation) (CMS/HCC) 8/21/2017    /atrial flutter   • Palpitations     Tachy Palpitations   • Psoriatic arthritis (CMS/HCC)    • Systolic heart failure (CMS/HCC)     Chronic class II   • Wears glasses     reading     Past Surgical History:   Procedure Laterality Date   • CARDIAC ABLATION     • CARDIAC CATHETERIZATION Left 2007    2 stents   • CARDIAC ELECTROPHYSIOLOGY PROCEDURE N/A 9/27/2017    Procedure: Implant ICD - bi ventricular;  Surgeon: Evan Leroy DO;  Location: [facility] INVASIVE LOCATION;  Service:    • COLONOSCOPY      > 10 years ago   • LIVER BIOPSY      x 3   • OTHER SURGICAL HISTORY      Melanoma removal of back    • REPLACEMENT TOTAL KNEE BILATERAL Bilateral      Family History   Problem Relation Age of Onset   • Cancer Mother    • Heart attack Father      Social History     Tobacco Use   • Smoking status: Never Smoker   • Smokeless tobacco: Never Used   Substance Use Topics   • Alcohol use: No         PHYSICAL EXAM:    /60 (BP Location: Left arm, Patient Position: Sitting)   Pulse 75   Ht 177.8 cm (70\")   Wt 111 kg (245 lb)   BMI 35.15 kg/m²        Wt Readings from Last 5 Encounters:   08/01/19 111 kg (245 lb)   01/24/19 111 kg (245 lb)   06/28/18 109 kg (240 lb)   01/15/18 118 kg (260 lb 3.2 oz)   09/27/17 119 kg (262 lb 2 oz)       BP Readings from Last 5 Encounters:   08/01/19 115/60   01/24/19 136/76   06/28/18 122/84   01/15/18 110/80   09/28/17 117/85       General-Well Nourished, Well developed elderly male in NAD   Eyes - PERRLA  Neck- supple, No mass  CV- regular rate and rhythm, no MRG, trace pedal edema   Lung- clear bilaterally  Abd- soft, +BS  Musc/skel - Norm strength and range of motion  Skin- warm and dry  Neuro - Alert & Oriented x 3, appropriate mood.        Medical problems and test results were reviewed with the patient " today.     No results found for this or any previous visit (from the past 672 hour(s)).      EKG: (EKG has been independently visualized by me and summarized below)      ECG 12 Lead  Date/Time: 8/1/2019 10:11 AM  Performed by: Laci Wiley MD  Authorized by: Laci Wiley MD   Previous ECG: no previous ECG available  Rhythm: sinus rhythm and paced  Ectopy: unifocal PVCs  Rate: normal  BPM: 75  Pacing: ventricular paced rhythm  Clinical impression: abnormal EKG             Device: normal functioning dual chamber icd. Mode switched <1%; longest 11.3 hours. BIV pacing 90%     ASSESSMENT and PLAN    1. Chronic Systolic Heart Failure- LVEF 30-35% s/p BIV ICD with normal function. NYHA Class II symptoms. No ACE/ARB secondary to CKD. Continue Coreg. Follow-up with Dr. Ayala.     2. PAF/Flutter s/p RFA. Mode switched < 1%; continue Coumadin      3. PVCs- Last PVC burden was 24K following initiation of PO Amiodarone. PVC burden 9000 today. Continue Amio 200mg daily. May need Mexiletine or ablation if it continues    4. Amio use - Monitor LFTs, Thyroid, and lungs.      Return in about 3 months (around 11/1/2019) for office visit and device check with [facility].      Laci Wiley M.D., F.A.C.C, F.H.R.S.  Cardiology/Electrophysiology  8/1/2019  10:11 AM

## 2019-08-16 ENCOUNTER — CLINICAL SUPPORT NO REQUIREMENTS (OUTPATIENT)
Dept: CARDIOLOGY | Facility: CLINIC | Age: 79
End: 2019-08-16

## 2019-08-16 DIAGNOSIS — I50.22 CHRONIC SYSTOLIC CONGESTIVE HEART FAILURE (HCC): Primary | ICD-10-CM

## 2019-09-03 ENCOUNTER — CLINICAL SUPPORT NO REQUIREMENTS (OUTPATIENT)
Dept: CARDIOLOGY | Facility: CLINIC | Age: 79
End: 2019-09-03

## 2019-09-03 DIAGNOSIS — I50.22 CHRONIC SYSTOLIC HEART FAILURE (HCC): Primary | ICD-10-CM

## 2019-09-03 DIAGNOSIS — I48.0 PAF (PAROXYSMAL ATRIAL FIBRILLATION) (HCC): ICD-10-CM

## 2019-09-03 DIAGNOSIS — I42.9 CARDIOMYOPATHY, UNSPECIFIED TYPE (HCC): ICD-10-CM

## 2019-09-26 ENCOUNTER — CLINICAL SUPPORT NO REQUIREMENTS (OUTPATIENT)
Dept: CARDIOLOGY | Facility: CLINIC | Age: 79
End: 2019-09-26

## 2019-09-26 DIAGNOSIS — I50.22 CHRONIC SYSTOLIC CONGESTIVE HEART FAILURE (HCC): Primary | ICD-10-CM

## 2019-09-26 DIAGNOSIS — I48.0 PAF (PAROXYSMAL ATRIAL FIBRILLATION) (HCC): ICD-10-CM

## 2019-09-26 DIAGNOSIS — I42.9 CARDIOMYOPATHY, UNSPECIFIED TYPE (HCC): ICD-10-CM

## 2019-09-26 PROCEDURE — 93296 REM INTERROG EVL PM/IDS: CPT | Performed by: INTERNAL MEDICINE

## 2019-09-26 PROCEDURE — 93295 DEV INTERROG REMOTE 1/2/MLT: CPT | Performed by: INTERNAL MEDICINE

## 2019-11-13 NOTE — PROGRESS NOTES
"    Omero Holland  1940  PCP: Dimitrios Morales III, MD    SUBJECTIVE:   Omero Holland is a 79 y.o. male seen for a follow up visit regarding the following:     Chief Complaint: Follow up for afib     HPI:    Since last visit the patient's status has been unstable. He has noticed an increasing amount of JOYNER, fatigue, and weakness. Reports he \"gives out\" easily on routine activities. No cp or edema.     Cardiac PMH: (Old records have been reviewed and summarized below)     Problem List:  1. Mixed CM        A)CM dating back to 2007 EF 30% by [facility]       B)[facility] ostial CX BMS 2016        C)MPS 2/17 EF 25% Inferior ischemia        D)C LAD BMS 3/17        E)Echocardiogram EF 35% 5/17        F)Echocardiogram EF 25% 6/16/17        G)MUGA  7/25/17 EF 35%        H)Chronic class III SHF with admission to [facility] in setting of PAF with RVR, invitation of Amiodarone and diuresis.  Cozaar stopped secondary to Hypotension and kidney issues       I ) s/p BIV ICD [employer]     2. Hx of remote Aflutter RFA 2013   3. Frequent PVC's/LBBB   4. HTN   5. HLD   6. LOIS/CPAP   7. BPH/Elevated PSA followed by Dr. Flynn   8. Hypothyroidism   9. Atrial fibrillation              A. Started on amiodarone              B. CHADSVASC = , on coumadin      Current Outpatient Medications:   •  allopurinol (ZYLOPRIM) 100 MG tablet, Take 100 mg by mouth Daily., Disp: , Rfl:   •  amiodarone (PACERONE) 200 MG tablet, TAKE 1 TABLET BY MOUTH 3 TIMES A DAY FOR 5 DAYS, THEN 2 TIMES A DAY FOR 10 DAYS, THEN ONCE DAILY (Patient taking differently: 200 mg Daily. TAKE 1 TABLET BY MOUTH 3 TIMES A DAY FOR 5 DAYS, THEN 2 TIMES A DAY FOR 10 DAYS, THEN ONCE DAILY), Disp: 50 tablet, Rfl: 0  •  atorvastatin (LIPITOR) 40 MG tablet, Take 40 mg by mouth Daily., Disp: , Rfl:   •  carvedilol (COREG) 12.5 MG tablet, Take 18.75 mg by mouth 2 (Two) Times a Day With Meals., Disp: , Rfl:   •  finasteride (PROSCAR) 5 MG tablet, Take 5 mg by mouth Every " Morning., Disp: , Rfl:   •  folic acid (FOLVITE) 1 MG tablet, Take 1 mg by mouth Every Morning., Disp: , Rfl:   •  glimepiride (AMARYL) 2 MG tablet, Take 2 mg by mouth Every Morning Before Breakfast., Disp: , Rfl:   •  isosorbide mononitrate (IMDUR) 30 MG 24 hr tablet, Take 30 mg by mouth Daily., Disp: , Rfl:   •  Magnesium Oxide 400 MG capsule, Take 400 mg by mouth 2 (Two) Times a Day., Disp: , Rfl:   •  metFORMIN (GLUCOPHAGE) 1000 MG tablet, Take 1,000 mg by mouth Daily With Breakfast., Disp: , Rfl:   •  methotrexate 2.5 MG tablet, Take 2.5 mg by mouth 1 (One) Time Per Week. 4 tablets per week on Mondays, Disp: , Rfl:   •  metOLazone (ZAROXOLYN) 2.5 MG tablet, Take 2.5 mg by mouth As Needed., Disp: , Rfl:   •  POTASSIUM CHLORIDE PO, Take 20 mEq by mouth 3 (Three) Times a Day., Disp: , Rfl:   •  torsemide (DEMADEX) 100 MG tablet, Take 50 mg by mouth 2 (Two) Times a Day., Disp: , Rfl:   •  warfarin (COUMADIN) 6 MG tablet, Take 6 mg by mouth Daily. 6 mg every day and 3 on thurs., Disp: , Rfl:     Past Medical History, Past Surgical History, Family history, Social History, and Medications were all reviewed with the patient today and updated as necessary.       Patient Active Problem List   Diagnosis   • Palpitations   • Cardiomyopathy (CMS/HCC)   • Hypertension   • Dyslipidemia   • Chronic systolic congestive heart failure (CMS/HCC)   • Psoriatic arthritis (CMS/HCC)   • Morbidly obese (CMS/HCC)   • LOIS (obstructive sleep apnea)   • PAF (paroxysmal atrial fibrillation) (CMS/HCC)   • PVC (premature ventricular contraction)   • Long term current use of antiarrhythmic medical therapy     Allergies   Allergen Reactions   • Codeine      Stomach pain     Past Medical History:   Diagnosis Date   • A-fib (CMS/HCC)    • Arthritis    • Cardiomyopathy (CMS/HCC)     Non-ischemic   • Coronary artery disease     2 stents   • Diabetes mellitus (CMS/HCC)     dx 3- 4 years ago- checks fsbs daily   • Dizziness    • Dyslipidemia    •  "Hypertension     H/o   • Kidney disease     HX- sees a nephrologist   • Melanoma (CMS/HCC)    • Obesity    • LOIS (obstructive sleep apnea)     cpap   • PAF (paroxysmal atrial fibrillation) (CMS/HCC) 8/21/2017    /atrial flutter   • Palpitations     Tachy Palpitations   • Psoriatic arthritis (CMS/HCC)    • Systolic heart failure (CMS/HCC)     Chronic class II   • Wears glasses     reading     Past Surgical History:   Procedure Laterality Date   • CARDIAC ABLATION     • CARDIAC CATHETERIZATION Left 2007    2 stents   • CARDIAC ELECTROPHYSIOLOGY PROCEDURE N/A 9/27/2017    Procedure: Implant ICD - bi ventricular;  Surgeon: Evan Leroy DO;  Location: [facility] INVASIVE LOCATION;  Service:    • COLONOSCOPY      > 10 years ago   • LIVER BIOPSY      x 3   • OTHER SURGICAL HISTORY      Melanoma removal of back    • REPLACEMENT TOTAL KNEE BILATERAL Bilateral      Family History   Problem Relation Age of Onset   • Cancer Mother    • Heart attack Father      Social History     Tobacco Use   • Smoking status: Never Smoker   • Smokeless tobacco: Never Used   Substance Use Topics   • Alcohol use: No         PHYSICAL EXAM:    /68 (BP Location: Left arm, Patient Position: Sitting)   Pulse 110   Ht 179.1 cm (70.5\")   Wt 107 kg (235 lb)   SpO2 97%   BMI 33.24 kg/m²        Wt Readings from Last 5 Encounters:   11/18/19 107 kg (235 lb)   08/01/19 111 kg (245 lb)   01/24/19 111 kg (245 lb)   06/28/18 109 kg (240 lb)   01/15/18 118 kg (260 lb 3.2 oz)       BP Readings from Last 5 Encounters:   11/18/19 118/68   08/01/19 115/60   01/24/19 136/76   06/28/18 122/84   01/15/18 110/80       General-Well Nourished, Well developed  Eyes - PERRLA  Neck- supple, No mass  CV- irregular rate and rhythm, no MRG, No edema  Lung- clear bilaterally  Abd- soft, +BS  Musc/skel - Norm strength and range of motion  Skin- warm and dry  Neuro - Alert & Oriented x 3, appropriate mood.        Medical problems and test results were reviewed with " the patient today.     No results found for this or any previous visit (from the past 672 hour(s)).      EKG: (EKG has been independently visualized by me and summarized below)      ECG 12 Lead  Date/Time: 11/18/2019 10:45 AM  Performed by: Gail Reddy PA  Authorized by: Gail Reddy PA   Comparison: compared with previous ECG from 8/1/2019  Comparison to previous ECG: Now in afib   Rhythm: atrial fibrillation  Rate: tachycardic  BPM: 103  Conduction: non-specific intraventricular conduction delay  QRS axis: right    Clinical impression: abnormal EKG             ASSESSMENT and PLAN    1. Chronic Systolic Heart Failure- LVEF 30-35% s/p BIV ICD with normal function. NYHA Class II symptoms. No ACE/ARB secondary to CKD. Continue Coreg. Follow-up with Dr. Ayala.      2. PAF/Flutter s/p RFA. Now 90% mode switched since last visit and only RV pacing 49% and LV pacing 83%. Patient with increasing symptoms of SOB, fatigue, and weakness. Do feel it is secondary to decreased BIV pacing due to elevated rates with afib. Unable to titrate AV jeanmarie agents due to BP and patient is already on Amiodarone and limited with other agents due to CKD. Will discuss with Dr. Wiley, but feel patient would be appropriate for AV node ablation. Will arrange if Dr. Wiley agrees. Continue Coumadin.      3. PVCs- Last PVC burden was 24K following initiation of PO Amiodarone. PVC burden minimal today on device check. Continue Amio 200mg daily. May need Mexiletine or ablation if it continues     4. Amio use - Monitor LFTs, Thyroid, and lungs.      Return in about 3 months (around 2/18/2020).        Gail Reddy PA-C   Cardiology/Electrophysiology  11/18/2019  10:47 AM

## 2019-11-18 ENCOUNTER — OFFICE VISIT (OUTPATIENT)
Dept: CARDIOLOGY | Facility: CLINIC | Age: 79
End: 2019-11-18

## 2019-11-18 VITALS
BODY MASS INDEX: 32.9 KG/M2 | WEIGHT: 235 LBS | DIASTOLIC BLOOD PRESSURE: 68 MMHG | HEIGHT: 71 IN | HEART RATE: 110 BPM | OXYGEN SATURATION: 97 % | SYSTOLIC BLOOD PRESSURE: 118 MMHG

## 2019-11-18 DIAGNOSIS — I49.3 PVC (PREMATURE VENTRICULAR CONTRACTION): ICD-10-CM

## 2019-11-18 DIAGNOSIS — I48.0 PAF (PAROXYSMAL ATRIAL FIBRILLATION) (HCC): Primary | ICD-10-CM

## 2019-11-18 PROCEDURE — 99214 OFFICE O/P EST MOD 30 MIN: CPT | Performed by: PHYSICIAN ASSISTANT

## 2019-11-18 PROCEDURE — 93289 INTERROG DEVICE EVAL HEART: CPT | Performed by: PHYSICIAN ASSISTANT

## 2019-11-20 ENCOUNTER — TELEPHONE (OUTPATIENT)
Dept: CARDIOLOGY | Facility: CLINIC | Age: 79
End: 2019-11-20

## 2019-11-20 DIAGNOSIS — I48.91 ATRIAL FIBRILLATION WITH RVR (HCC): Primary | ICD-10-CM

## 2019-11-21 PROBLEM — I48.91 ATRIAL FIBRILLATION WITH RVR: Status: ACTIVE | Noted: 2019-11-21

## 2019-12-04 ENCOUNTER — PREP FOR SURGERY (OUTPATIENT)
Dept: OTHER | Facility: HOSPITAL | Age: 79
End: 2019-12-04

## 2019-12-04 DIAGNOSIS — I48.19 ATRIAL FIBRILLATION, PERSISTENT (HCC): Primary | ICD-10-CM

## 2019-12-04 RX ORDER — ACETAMINOPHEN 325 MG/1
650 TABLET ORAL EVERY 4 HOURS PRN
Status: CANCELLED | OUTPATIENT
Start: 2019-12-04

## 2019-12-04 RX ORDER — SODIUM CHLORIDE 0.9 % (FLUSH) 0.9 %
3 SYRINGE (ML) INJECTION EVERY 12 HOURS SCHEDULED
Status: CANCELLED | OUTPATIENT
Start: 2019-12-04

## 2019-12-04 RX ORDER — SODIUM CHLORIDE 0.9 % (FLUSH) 0.9 %
10 SYRINGE (ML) INJECTION AS NEEDED
Status: CANCELLED | OUTPATIENT
Start: 2019-12-04

## 2019-12-04 RX ORDER — ONDANSETRON 2 MG/ML
4 INJECTION INTRAMUSCULAR; INTRAVENOUS EVERY 6 HOURS PRN
Status: CANCELLED | OUTPATIENT
Start: 2019-12-04

## 2019-12-12 ENCOUNTER — HOSPITAL ENCOUNTER (OUTPATIENT)
Facility: HOSPITAL | Age: 79
Setting detail: HOSPITAL OUTPATIENT SURGERY
Discharge: HOME | End: 2019-12-12
Attending: INTERNAL MEDICINE | Admitting: INTERNAL MEDICINE

## 2019-12-12 VITALS
BODY MASS INDEX: 34.12 KG/M2 | HEIGHT: 70 IN | TEMPERATURE: 97.7 F | WEIGHT: 238.32 LBS | RESPIRATION RATE: 18 BRPM | SYSTOLIC BLOOD PRESSURE: 113 MMHG | HEART RATE: 68 BPM | DIASTOLIC BLOOD PRESSURE: 76 MMHG | OXYGEN SATURATION: 95 %

## 2019-12-12 DIAGNOSIS — I48.91 ATRIAL FIBRILLATION WITH RVR (HCC): ICD-10-CM

## 2019-12-12 LAB
ANION GAP SERPL CALCULATED.3IONS-SCNC: 14 MMOL/L (ref 5–15)
BASOPHILS # BLD AUTO: 0.12 10*3/MM3 (ref 0–0.2)
BASOPHILS NFR BLD AUTO: 1.2 % (ref 0–1.5)
BUN BLD-MCNC: 32 MG/DL (ref 8–23)
BUN/CREAT SERPL: 21.6 (ref 7–25)
CALCIUM SPEC-SCNC: 9.5 MG/DL (ref 8.6–10.5)
CHLORIDE SERPL-SCNC: 93 MMOL/L (ref 98–107)
CO2 SERPL-SCNC: 31 MMOL/L (ref 22–29)
CREAT BLD-MCNC: 1.48 MG/DL (ref 0.76–1.27)
DEPRECATED RDW RBC AUTO: 55.7 FL (ref 37–54)
EOSINOPHIL # BLD AUTO: 0.17 10*3/MM3 (ref 0–0.4)
EOSINOPHIL NFR BLD AUTO: 1.7 % (ref 0.3–6.2)
ERYTHROCYTE [DISTWIDTH] IN BLOOD BY AUTOMATED COUNT: 15.8 % (ref 12.3–15.4)
GFR SERPL CREATININE-BSD FRML MDRD: 46 ML/MIN/1.73
GFR SERPL CREATININE-BSD FRML MDRD: 56 ML/MIN/1.73
GLUCOSE BLD-MCNC: 158 MG/DL (ref 65–99)
GLUCOSE BLDC GLUCOMTR-MCNC: 142 MG/DL (ref 70–130)
HCT VFR BLD AUTO: 42.3 % (ref 37.5–51)
HGB BLD-MCNC: 13.6 G/DL (ref 13–17.7)
IMM GRANULOCYTES # BLD AUTO: 0.02 10*3/MM3 (ref 0–0.05)
IMM GRANULOCYTES NFR BLD AUTO: 0.2 % (ref 0–0.5)
INR PPP: 2.09 (ref 0.85–1.16)
LYMPHOCYTES # BLD AUTO: 2.73 10*3/MM3 (ref 0.7–3.1)
LYMPHOCYTES NFR BLD AUTO: 27.2 % (ref 19.6–45.3)
MCH RBC QN AUTO: 31.1 PG (ref 26.6–33)
MCHC RBC AUTO-ENTMCNC: 32.2 G/DL (ref 31.5–35.7)
MCV RBC AUTO: 96.8 FL (ref 79–97)
MONOCYTES # BLD AUTO: 0.85 10*3/MM3 (ref 0.1–0.9)
MONOCYTES NFR BLD AUTO: 8.5 % (ref 5–12)
NEUTROPHILS # BLD AUTO: 6.15 10*3/MM3 (ref 1.7–7)
NEUTROPHILS NFR BLD AUTO: 61.2 % (ref 42.7–76)
NRBC BLD AUTO-RTO: 0 /100 WBC (ref 0–0.2)
PLATELET # BLD AUTO: 226 10*3/MM3 (ref 140–450)
PMV BLD AUTO: 10.9 FL (ref 6–12)
POTASSIUM BLD-SCNC: 3.4 MMOL/L (ref 3.5–5.2)
PROTHROMBIN TIME: 22.6 SECONDS (ref 11.2–14.3)
RBC # BLD AUTO: 4.37 10*6/MM3 (ref 4.14–5.8)
SODIUM BLD-SCNC: 138 MMOL/L (ref 136–145)
WBC NRBC COR # BLD: 10.04 10*3/MM3 (ref 3.4–10.8)

## 2019-12-12 PROCEDURE — 82962 GLUCOSE BLOOD TEST: CPT

## 2019-12-12 PROCEDURE — 25010000002 MIDAZOLAM PER 1 MG: Performed by: INTERNAL MEDICINE

## 2019-12-12 PROCEDURE — 93287 PERI-PX DEVICE EVAL & PRGR: CPT | Performed by: INTERNAL MEDICINE

## 2019-12-12 PROCEDURE — C1894 INTRO/SHEATH, NON-LASER: HCPCS | Performed by: INTERNAL MEDICINE

## 2019-12-12 PROCEDURE — 85025 COMPLETE CBC W/AUTO DIFF WBC: CPT | Performed by: INTERNAL MEDICINE

## 2019-12-12 PROCEDURE — C1733 CATH, EP, OTHR THAN COOL-TIP: HCPCS | Performed by: INTERNAL MEDICINE

## 2019-12-12 PROCEDURE — 25010000003 CEFAZOLIN IN DEXTROSE 2-4 GM/100ML-% SOLUTION: Performed by: INTERNAL MEDICINE

## 2019-12-12 PROCEDURE — 85610 PROTHROMBIN TIME: CPT | Performed by: INTERNAL MEDICINE

## 2019-12-12 PROCEDURE — 25010000003 LIDOCAINE 1 % SOLUTION: Performed by: INTERNAL MEDICINE

## 2019-12-12 PROCEDURE — S0260 H&P FOR SURGERY: HCPCS | Performed by: INTERNAL MEDICINE

## 2019-12-12 PROCEDURE — 99152 MOD SED SAME PHYS/QHP 5/>YRS: CPT | Performed by: INTERNAL MEDICINE

## 2019-12-12 PROCEDURE — 93650 ICAR CATH ABLTJ AV NODE FUNC: CPT | Performed by: INTERNAL MEDICINE

## 2019-12-12 PROCEDURE — 99153 MOD SED SAME PHYS/QHP EA: CPT | Performed by: INTERNAL MEDICINE

## 2019-12-12 PROCEDURE — 93005 ELECTROCARDIOGRAM TRACING: CPT | Performed by: INTERNAL MEDICINE

## 2019-12-12 PROCEDURE — 25010000002 FENTANYL CITRATE (PF) 100 MCG/2ML SOLUTION: Performed by: INTERNAL MEDICINE

## 2019-12-12 PROCEDURE — 80048 BASIC METABOLIC PNL TOTAL CA: CPT | Performed by: INTERNAL MEDICINE

## 2019-12-12 RX ORDER — ONDANSETRON 2 MG/ML
4 INJECTION INTRAMUSCULAR; INTRAVENOUS EVERY 6 HOURS PRN
Status: DISCONTINUED | OUTPATIENT
Start: 2019-12-12 | End: 2019-12-12 | Stop reason: HOSPADM

## 2019-12-12 RX ORDER — ACETAMINOPHEN 325 MG/1
650 TABLET ORAL EVERY 4 HOURS PRN
Status: DISCONTINUED | OUTPATIENT
Start: 2019-12-12 | End: 2019-12-12 | Stop reason: HOSPADM

## 2019-12-12 RX ORDER — OXYCODONE HYDROCHLORIDE AND ACETAMINOPHEN 5; 325 MG/1; MG/1
1 TABLET ORAL EVERY 4 HOURS PRN
Status: DISCONTINUED | OUTPATIENT
Start: 2019-12-12 | End: 2019-12-12 | Stop reason: HOSPADM

## 2019-12-12 RX ORDER — SODIUM CHLORIDE 0.9 % (FLUSH) 0.9 %
3 SYRINGE (ML) INJECTION EVERY 12 HOURS SCHEDULED
Status: DISCONTINUED | OUTPATIENT
Start: 2019-12-12 | End: 2019-12-12 | Stop reason: HOSPADM

## 2019-12-12 RX ORDER — LIDOCAINE HYDROCHLORIDE 10 MG/ML
INJECTION, SOLUTION INFILTRATION; PERINEURAL AS NEEDED
Status: DISCONTINUED | OUTPATIENT
Start: 2019-12-12 | End: 2019-12-12 | Stop reason: HOSPADM

## 2019-12-12 RX ORDER — MIDAZOLAM HYDROCHLORIDE 1 MG/ML
INJECTION INTRAMUSCULAR; INTRAVENOUS AS NEEDED
Status: DISCONTINUED | OUTPATIENT
Start: 2019-12-12 | End: 2019-12-12 | Stop reason: HOSPADM

## 2019-12-12 RX ORDER — CEFAZOLIN SODIUM 2 G/100ML
2 INJECTION, SOLUTION INTRAVENOUS ONCE
Status: COMPLETED | OUTPATIENT
Start: 2019-12-12 | End: 2019-12-12

## 2019-12-12 RX ORDER — IBUPROFEN 200 MG
400 TABLET ORAL EVERY 6 HOURS PRN
Status: DISCONTINUED | OUTPATIENT
Start: 2019-12-12 | End: 2019-12-12 | Stop reason: HOSPADM

## 2019-12-12 RX ORDER — ACETAMINOPHEN 650 MG/1
650 SUPPOSITORY RECTAL EVERY 4 HOURS PRN
Status: DISCONTINUED | OUTPATIENT
Start: 2019-12-12 | End: 2019-12-12 | Stop reason: HOSPADM

## 2019-12-12 RX ORDER — SODIUM CHLORIDE 0.9 % (FLUSH) 0.9 %
10 SYRINGE (ML) INJECTION AS NEEDED
Status: DISCONTINUED | OUTPATIENT
Start: 2019-12-12 | End: 2019-12-12 | Stop reason: HOSPADM

## 2019-12-12 RX ORDER — FENTANYL CITRATE 50 UG/ML
INJECTION, SOLUTION INTRAMUSCULAR; INTRAVENOUS AS NEEDED
Status: DISCONTINUED | OUTPATIENT
Start: 2019-12-12 | End: 2019-12-12 | Stop reason: HOSPADM

## 2019-12-12 RX ORDER — SODIUM CHLORIDE 9 MG/ML
INJECTION, SOLUTION INTRAVENOUS CONTINUOUS PRN
Status: COMPLETED | OUTPATIENT
Start: 2019-12-12 | End: 2019-12-12

## 2019-12-12 RX ADMIN — CEFAZOLIN SODIUM 2 G: 2 INJECTION, SOLUTION INTRAVENOUS at 14:50

## 2019-12-12 NOTE — H&P
Primary Cardiologist: Dr. Melendez     Chief Complaint: afib w/ RVR       Subjective:     Patient is a 79 y.o. male who presents with symptomatic atrial fibrillation w/ RVR for AV node ablation. He has a history of chronic systolic heart failure s/p BIV ICD. His afib was previously controlled on amiodarone. He was seen in follow-up in November and was noted to have 90% mode switching and subsequently decreased BIV pacing. He was symptomatic with fatigue and JOYNER to a point he was unable to complete his usual activities. The use of AV jeanmarie agents was limited due to hypotension and the use of other AADs are limited because of CKD and structural heart disease. He has not had any changes since he was last seen a few weeks ago. No recent CP, fevers, chills.     Problem List:  1. Mixed CM        A)CM dating back to 2007 EF 30% by [facility]       B)[facility] ostial CX BMS 2016        C)MPS 2/17 EF 25% Inferior ischemia        D)[facility] LAD BMS 3/17        E)Echocardiogram EF 35% 5/17        F)Echocardiogram EF 25% 6/16/17        G)MUGA  7/25/17 EF 35%        H)Chronic class III SHF with admission to [facility] in setting of PAF with RVR, invitation of Amiodarone and diuresis.  Cozaar stopped secondary to Hypotension and kidney issues       I ) s/p BIV ICD [employer]        J) Recurrent afib despite amiodarone. Now s/p AV node ablation 12/2019   2. Hx of remote Aflutter RFA 2013   3. Frequent PVC's/LBBB   4. HTN   5. HLD   6. LOIS/CPAP   7. BPH/Elevated PSA followed by Dr. Flynn   8. Hypothyroidism   9. Atrial fibrillation              A. Started on amiodarone              B. CHADSVASC = , on coumadin   C. Recurrent afib w/ 90% mode switching and decreased BIV pacing. Now s/p AV node ablation 12/2019     Past Medical History:   Diagnosis Date   • A-fib (CMS/HCC)    • Arthritis    • Cardiomyopathy (CMS/HCC)     Non-ischemic   • Coronary artery disease     2 stents   • Diabetes mellitus (CMS/HCC)     dx 3- 4 years ago- checks fsbs daily   •  Dizziness    • Dyslipidemia    • Hyperlipidemia    • Hypertension     H/o   • Kidney disease     HX- sees a nephrologist   • Melanoma (CMS/HCC)    • Obesity    • LOIS (obstructive sleep apnea)     cpap   • PAF (paroxysmal atrial fibrillation) (CMS/HCC) 8/21/2017    /atrial flutter   • Palpitations     Tachy Palpitations   • Psoriatic arthritis (CMS/HCC)    • Systolic heart failure (CMS/HCC)     Chronic class II   • Wears glasses     reading      Past Surgical History:   Procedure Laterality Date   • CARDIAC ABLATION     • CARDIAC CATHETERIZATION Left 2007    2 stents   • CARDIAC ELECTROPHYSIOLOGY PROCEDURE N/A 9/27/2017    Procedure: Implant ICD - bi ventricular;  Surgeon: Evan Leroy DO;  Location: [facility] INVASIVE LOCATION;  Service:    • COLONOSCOPY      > 10 years ago   • LIVER BIOPSY      x 3   • OTHER SURGICAL HISTORY      Melanoma removal of back    • PROSTATE BIOPSY     • REPLACEMENT TOTAL KNEE BILATERAL Bilateral       Allergies   Allergen Reactions   • Codeine      Stomach pain     Social History     Tobacco Use   • Smoking status: Never Smoker   • Smokeless tobacco: Never Used   Substance Use Topics   • Alcohol use: No      FH:   Family History   Problem Relation Age of Onset   • Cancer Mother    • Heart attack Father           Current Facility-Administered Medications:   •  acetaminophen (TYLENOL) tablet 650 mg, 650 mg, Oral, Q4H PRN, Gail Reddy, PA  •  ondansetron (ZOFRAN) injection 4 mg, 4 mg, Intravenous, Q6H PRN, Gail Reddy, PA  •  sodium chloride 0.9 % flush 10 mL, 10 mL, Intravenous, PRN, Gail Reddy, PA  •  sodium chloride 0.9 % flush 3 mL, 3 mL, Intravenous, Q12H, Gail Reddy PA    Review of Systems  Review of Systems:  General: no recent weight loss/gain, weakness or fatigue  Skin: no rashes, lumps, or other skin changes  HEENT: no dizziness, lightheadedness, or vision changes  Respiratory: no cough or hemoptysis  Cardiovascular: + palpitations, and  tachycardia  Gastrointestinal: no black/tarry stools or diarrhea  Urinary: no change in frequency or urgency  Peripheral Vascular: no claudication or leg cramps  Musculoskeletal: no muscle or joint pain/stiffness  Psychiatric: no depression or excessive stress  Neurological: no sensory or motor loss, no syncope  Hematologic: no anemia, easy bruising or bleeding  Endocrine: no thyroid problems, nor heat or cold intolerance        Objective:       There were no vitals taken for this visit.    No intake/output data recorded.  No intake/output data recorded.    Physical Exam:  General-Well Nourished, Well developed  Eyes - PERRLA  Neck- supple, No mass  CV- regular rate and rhythm, no MRG  Lung- clear bilaterally  Abd- soft, +BS  Musc/skel - Norm strength and range of motion  Skin- warm and dry  Neuro - Alert & Oriented x 3, appropriate mood.      Data Review:     Recent Results (from the past 24 hour(s))   CBC Auto Differential    Collection Time: 12/12/19 10:18 AM   Result Value Ref Range    WBC 10.04 3.40 - 10.80 10*3/mm3    RBC 4.37 4.14 - 5.80 10*6/mm3    Hemoglobin 13.6 13.0 - 17.7 g/dL    Hematocrit 42.3 37.5 - 51.0 %    MCV 96.8 79.0 - 97.0 fL    MCH 31.1 26.6 - 33.0 pg    MCHC 32.2 31.5 - 35.7 g/dL    RDW 15.8 (H) 12.3 - 15.4 %    RDW-SD 55.7 (H) 37.0 - 54.0 fl    MPV 10.9 6.0 - 12.0 fL    Platelets 226 140 - 450 10*3/mm3    Neutrophil % 61.2 42.7 - 76.0 %    Lymphocyte % 27.2 19.6 - 45.3 %    Monocyte % 8.5 5.0 - 12.0 %    Eosinophil % 1.7 0.3 - 6.2 %    Basophil % 1.2 0.0 - 1.5 %    Immature Grans % 0.2 0.0 - 0.5 %    Neutrophils, Absolute 6.15 1.70 - 7.00 10*3/mm3    Lymphocytes, Absolute 2.73 0.70 - 3.10 10*3/mm3    Monocytes, Absolute 0.85 0.10 - 0.90 10*3/mm3    Eosinophils, Absolute 0.17 0.00 - 0.40 10*3/mm3    Basophils, Absolute 0.12 0.00 - 0.20 10*3/mm3    Immature Grans, Absolute 0.02 0.00 - 0.05 10*3/mm3    nRBC 0.0 0.0 - 0.2 /100 WBC           Assessment:     Atrial fibrillation with RVR  (CMS/[facility])         Plan:     1. Chronic Systolic Heart Failure- LVEF 30-35% s/p BIV ICD with normal function. NYHA Class II symptoms. No ACE/ARB secondary to CKD. Continue Coreg. Follow-up with Dr. Ayala.      2. PAF/Flutter s/p RFA. Now 90% mode switched since last visit and only RV pacing 49% and LV pacing 83%. Patient with increasing symptoms of SOB, fatigue, and weakness. Do feel it is secondary to decreased BIV pacing due to elevated rates with afib. Unable to titrate AV jeanmarie agents due to BP and patient is already on Amiodarone and limited with other agents due to CKD. Discussed with Dr. Wiley who agreed with proceeding with AV node ablation due to limitations with AV jeanmarie agents and limited AAD options.      3. PVCs- Last PVC burden was 24K following initiation of PO Amiodarone. PVC burden minimal today on device check. Continue Amio 200mg daily. May need Mexiletine or ablation if it continues     4. Amio use - Monitor LFTs, Thyroid, and lungs.    Electronically signed by BOOGIE Tovar, 12/12/19, 10:53 AM.

## 2019-12-12 NOTE — PROCEDURES
PRE-ELECTROPHYSIOLOGY STUDY DIAGNOSES  1. Atrial Fibrillation  2. Tachy/Floyd syndrome  3. Previous device implant     PROCEDURE PERFORMED  1. AV Node ablation.  2. Cathy-Procedure Device reprogramming     Anesthesia:   I was present with the patient for the duration of moderate sedation and supervised staff who had no other duties and monitored the patient for the entire procedure     Name of independent trained observer: La Valdez RN  Intra-Service start time: 1459  Intra-Service end time: 1532     Estimated Blood Loss: Less than 10 mL     Specimens: None sent     PROCEDURE IN DETAIL: The patient was brought into the EP lab in a fasting  state. The right groin was prepped and draped in the usual  sterile fashion. Access was obtained in the right femoral vein via the  Seldinger technique over which an 8 -Uzbek sheath was placed.  Through the 8-Uzbek sheath, a large curved 8 mm Blazer II ablation catheter was  placed. The AV node was mapped and ablated with 60 W of energy, 60 degree heat for  60 seconds. The patient was monitored for 10 minutes. The device was reprogrammed.        The sheaths were then pulled. Hemostasis was achieved. The patient recovered from their sedation, transferred from the lab in a stable condition.     IMPRESSION: Successful catheter mapping ablation of AV node to form complete AV Block

## 2020-01-22 ENCOUNTER — TELEPHONE (OUTPATIENT)
Dept: CARDIOLOGY | Facility: CLINIC | Age: 80
End: 2020-01-22

## 2020-01-22 NOTE — TELEPHONE ENCOUNTER
Called pt regarding scheduled Latitude reading wasn't received today.  Left message for pt to return my call.    Wife called back and pt is in hospital post hip surgery.

## 2020-03-04 ENCOUNTER — CLINICAL SUPPORT NO REQUIREMENTS (OUTPATIENT)
Dept: CARDIOLOGY | Facility: CLINIC | Age: 80
End: 2020-03-04

## 2020-03-04 DIAGNOSIS — I48.0 PAF (PAROXYSMAL ATRIAL FIBRILLATION) (HCC): ICD-10-CM

## 2020-03-04 DIAGNOSIS — I50.22 CHRONIC SYSTOLIC CONGESTIVE HEART FAILURE (HCC): ICD-10-CM

## 2020-03-04 PROCEDURE — 93296 REM INTERROG EVL PM/IDS: CPT | Performed by: INTERNAL MEDICINE

## 2020-03-04 PROCEDURE — 93295 DEV INTERROG REMOTE 1/2/MLT: CPT | Performed by: INTERNAL MEDICINE

## 2020-06-04 ENCOUNTER — TELEPHONE (OUTPATIENT)
Dept: CARDIOLOGY | Facility: CLINIC | Age: 80
End: 2020-06-04

## 2020-06-04 NOTE — TELEPHONE ENCOUNTER
Left message for Mr Holland to return my call to speak with him regarding his latitude monitor not transmitting.

## 2020-12-10 ENCOUNTER — OFFICE VISIT (OUTPATIENT)
Dept: CARDIOLOGY | Facility: CLINIC | Age: 80
End: 2020-12-10

## 2020-12-10 VITALS
WEIGHT: 238 LBS | DIASTOLIC BLOOD PRESSURE: 62 MMHG | HEART RATE: 75 BPM | BODY MASS INDEX: 34.07 KG/M2 | SYSTOLIC BLOOD PRESSURE: 104 MMHG | OXYGEN SATURATION: 98 % | HEIGHT: 70 IN

## 2020-12-10 DIAGNOSIS — I49.3 PVC (PREMATURE VENTRICULAR CONTRACTION): ICD-10-CM

## 2020-12-10 DIAGNOSIS — I48.21 PERMANENT ATRIAL FIBRILLATION (HCC): Primary | ICD-10-CM

## 2020-12-10 DIAGNOSIS — Z79.899 LONG TERM CURRENT USE OF ANTIARRHYTHMIC MEDICAL THERAPY: ICD-10-CM

## 2020-12-10 DIAGNOSIS — I50.22 CHRONIC SYSTOLIC CONGESTIVE HEART FAILURE (HCC): ICD-10-CM

## 2020-12-10 PROCEDURE — 93284 PRGRMG EVAL IMPLANTABLE DFB: CPT | Performed by: INTERNAL MEDICINE

## 2020-12-10 PROCEDURE — 99214 OFFICE O/P EST MOD 30 MIN: CPT | Performed by: INTERNAL MEDICINE

## 2020-12-10 NOTE — PROGRESS NOTES
Omero Holland  1940  PCP: Dimitrios Morales III, MD    SUBJECTIVE:   Omero Holland is a 80 y.o. male seen for a follow up visit regarding the following:     Chief Complaint: Follow up for afib     HPI:    Since last visit the patient's status has been stable. However, He had a fall and hurt his hip. That now is his biggest complaint. Stable JOYNER, fatigue, and weakness. No cp or edema. Using PRN extra Demadex. Feeling better after AV Node ablation.     Cardiac PMH: (Old records have been reviewed and summarized below)     Problem List:  1. Mixed CM        A)CM dating back to 2007 EF 30% by [facility]       B)[facility] ostial CX BMS 2016        C)MPS 2/17 EF 25% Inferior ischemia        D)[facility] LAD BMS 3/17        E)Echocardiogram EF 35% 5/17        F)Echocardiogram EF 25% 6/16/17        G)MUGA  7/25/17 EF 35%        H)Chronic class III SHF with admission to [facility] in setting of PAF with RVR, invitation of Amiodarone and diuresis.  Cozaar stopped secondary to Hypotension and kidney issues       I ) s/p BIV ICD [employer]     2. Hx of remote Aflutter RFA 2013   3. Frequent PVC's/LBBB   4. HTN   5. HLD   6. LOIS/CPAP   7. BPH/Elevated PSA followed by Dr. Flynn   8. Hypothyroidism   9. Atrial fibrillation              A. Started on amiodarone              B. CHADSVASC = , on coumadin      Current Outpatient Medications:   •  allopurinol (ZYLOPRIM) 100 MG tablet, Take 100 mg by mouth Daily., Disp: , Rfl:   •  amiodarone (PACERONE) 200 MG tablet, TAKE 1 TABLET BY MOUTH 3 TIMES A DAY FOR 5 DAYS, THEN 2 TIMES A DAY FOR 10 DAYS, THEN ONCE DAILY (Patient taking differently: 200 mg Daily. TAKE 1 TABLET BY MOUTH 3 TIMES A DAY FOR 5 DAYS, THEN 2 TIMES A DAY FOR 10 DAYS, THEN ONCE DAILY), Disp: 50 tablet, Rfl: 0  •  atorvastatin (LIPITOR) 40 MG tablet, Take 40 mg by mouth Daily., Disp: , Rfl:   •  carvedilol (COREG) 12.5 MG tablet, Take 12.5 mg by mouth 2 (Two) Times a Day With Meals. 1.5 TABLET TWICE DAILY, Disp: ,  Rfl:   •  finasteride (PROSCAR) 5 MG tablet, Take 5 mg by mouth Every Morning., Disp: , Rfl:   •  glimepiride (AMARYL) 2 MG tablet, Take 2 mg by mouth Every Morning Before Breakfast., Disp: , Rfl:   •  magnesium oxide (MAGOX) 400 (241.3 Mg) MG tablet tablet, Take 400 mg by mouth 2 (Two) Times a Day., Disp: , Rfl:   •  metFORMIN (GLUCOPHAGE) 1000 MG tablet, Take 1,000 mg by mouth Daily With Breakfast., Disp: , Rfl:   •  metOLazone (ZAROXOLYN) 2.5 MG tablet, Take 2.5 mg by mouth As Needed., Disp: , Rfl:   •  POTASSIUM CHLORIDE PO, Take 40 mEq by mouth 2 (Two) Times a Day., Disp: , Rfl:   •  torsemide (DEMADEX) 100 MG tablet, Take 50 mg by mouth 2 (Two) Times a Day., Disp: , Rfl:   •  warfarin (COUMADIN) 6 MG tablet, Take 6 mg by mouth Daily. 6 mg every day and 3 on thurs., Disp: , Rfl:     Past Medical History, Past Surgical History, Family history, Social History, and Medications were all reviewed with the patient today and updated as necessary.       Patient Active Problem List   Diagnosis   • Palpitations   • Cardiomyopathy (CMS/HCC)   • Hypertension   • Dyslipidemia   • Chronic systolic congestive heart failure (CMS/HCC)   • Psoriatic arthritis (CMS/HCC)   • Morbidly obese (CMS/HCC)   • LOIS (obstructive sleep apnea)   • PAF (paroxysmal atrial fibrillation) (CMS/HCC)   • PVC (premature ventricular contraction)   • Long term current use of antiarrhythmic medical therapy   • Atrial fibrillation with RVR (CMS/HCC)   • Permanent atrial fibrillation (CMS/HCC)     Allergies   Allergen Reactions   • Codeine      Stomach pain     Past Medical History:   Diagnosis Date   • A-fib (CMS/HCC)    • Arthritis    • Cardiomyopathy (CMS/HCC)     Non-ischemic   • Coronary artery disease     2 stents   • Diabetes mellitus (CMS/HCC)     dx 3- 4 years ago- checks fsbs daily   • Dizziness    • Dyslipidemia    • Hyperlipidemia    • Hypertension     H/o   • Kidney disease     HX- sees a nephrologist   • Melanoma (CMS/HCC)    • Obesity    •  "LOIS (obstructive sleep apnea)     cpap   • PAF (paroxysmal atrial fibrillation) (CMS/HCC) 8/21/2017    /atrial flutter   • Palpitations     Tachy Palpitations   • Psoriatic arthritis (CMS/HCC)    • Systolic heart failure (CMS/HCC)     Chronic class II   • Wears glasses     reading     Past Surgical History:   Procedure Laterality Date   • CARDIAC ABLATION     • CARDIAC CATHETERIZATION Left 2007    2 stents   • CARDIAC ELECTROPHYSIOLOGY PROCEDURE N/A 9/27/2017    Procedure: Implant ICD - bi ventricular;  Surgeon: Evan Leroy DO;  Location:  [facility] INVASIVE LOCATION;  Service:    • CARDIAC ELECTROPHYSIOLOGY PROCEDURE N/A 12/12/2019    Procedure: AV node ablation- hold coumadin one day;  Surgeon: Laci Wiley MD;  Location:  [facility];  Service: Cardiovascular   • COLONOSCOPY      > 10 years ago   • HIP SURGERY  2020   • LIVER BIOPSY      x 3   • OTHER SURGICAL HISTORY      Melanoma removal of back    • PROSTATE BIOPSY     • REPLACEMENT TOTAL KNEE BILATERAL Bilateral      Family History   Problem Relation Age of Onset   • Cancer Mother    • Heart attack Father      Social History     Tobacco Use   • Smoking status: Never Smoker   • Smokeless tobacco: Never Used   Substance Use Topics   • Alcohol use: No         PHYSICAL EXAM:    /62 (BP Location: Left arm, Patient Position: Sitting)   Pulse 75   Ht 177.8 cm (70\")   Wt 108 kg (238 lb)   SpO2 98%   BMI 34.15 kg/m²        Wt Readings from Last 5 Encounters:   12/10/20 108 kg (238 lb)   12/12/19 108 kg (238 lb 5.1 oz)   11/18/19 107 kg (235 lb)   08/01/19 111 kg (245 lb)   01/24/19 111 kg (245 lb)       BP Readings from Last 5 Encounters:   12/10/20 104/62   12/12/19 113/76   11/18/19 118/68   08/01/19 115/60   01/24/19 136/76       General-Well Nourished, Well developed  Eyes - PERRLA  Neck- supple, No mass  CV- irregular rate and rhythm, no MRG, No edema  Lung- clear bilaterally  Abd- soft, +BS  Musc/skel - Norm strength and range " of motion  Skin- warm and dry  Neuro - Alert & Oriented x 3, appropriate mood.        Medical problems and test results were reviewed with the patient today.     No results found for this or any previous visit (from the past 672 hour(s)).      EKG: (EKG has been independently visualized by me and summarized below)      ECG 12 Lead    Date/Time: 12/10/2020 1:21 PM  Performed by: Laci Wiley MD  Authorized by: Laci Wiley MD   Comparison: compared with previous ECG   Similar to previous ECG  Rhythm: atrial fibrillation and paced  Ectopy: unifocal PVCs  Rate: normal    Clinical impression: abnormal EKG             ASSESSMENT and PLAN    1. Chronic Systolic Heart Failure- LVEF 30-35% s/p BIV ICD with normal function. NYHA Class II symptoms. No ACE/ARB secondary to CKD. Continue Coreg.       2. A. Fib - Now Perm with 100% mode switched since last visit and Biv pacing 90%. S/P AV node modification.      3. PVCs- Last PVC burden was 24K following initiation of PO Amiodarone. PVC burden minimal today on device check. Continue Amio 200mg daily. May need Mexiletine or ablation if it continues     4. Amio use - Monitor LFTs, Thyroid, and lungs. Will stop at the next visit if stable.       Return in about 6 months (around 6/10/2021) for office visit and device check with [employer].      Laci Wiley M.D., F.A.C.C, F.H.R.S.  Cardiology/Electrophysiology  12/10/2020  13:20 EST

## 2021-03-14 PROCEDURE — 93295 DEV INTERROG REMOTE 1/2/MLT: CPT | Performed by: INTERNAL MEDICINE

## 2021-03-14 PROCEDURE — 93296 REM INTERROG EVL PM/IDS: CPT | Performed by: INTERNAL MEDICINE

## 2021-06-13 PROCEDURE — 93296 REM INTERROG EVL PM/IDS: CPT | Performed by: INTERNAL MEDICINE

## 2021-06-13 PROCEDURE — 93295 DEV INTERROG REMOTE 1/2/MLT: CPT | Performed by: INTERNAL MEDICINE

## 2021-06-24 ENCOUNTER — OFFICE VISIT (OUTPATIENT)
Dept: CARDIOLOGY | Facility: CLINIC | Age: 81
End: 2021-06-24

## 2021-06-24 VITALS
SYSTOLIC BLOOD PRESSURE: 116 MMHG | WEIGHT: 244 LBS | HEART RATE: 76 BPM | HEIGHT: 70 IN | BODY MASS INDEX: 34.93 KG/M2 | OXYGEN SATURATION: 96 % | DIASTOLIC BLOOD PRESSURE: 78 MMHG

## 2021-06-24 DIAGNOSIS — I50.22 CHRONIC SYSTOLIC (CONGESTIVE) HEART FAILURE (HCC): Primary | ICD-10-CM

## 2021-06-24 DIAGNOSIS — R53.82 CHRONIC FATIGUE: ICD-10-CM

## 2021-06-24 DIAGNOSIS — R06.09 DYSPNEA ON EXERTION: ICD-10-CM

## 2021-06-24 DIAGNOSIS — I50.22 CHRONIC SYSTOLIC CONGESTIVE HEART FAILURE (HCC): ICD-10-CM

## 2021-06-24 DIAGNOSIS — I48.21 PERMANENT ATRIAL FIBRILLATION (HCC): ICD-10-CM

## 2021-06-24 DIAGNOSIS — Z95.810 BIVENTRICULAR AUTOMATIC IMPLANTABLE CARDIOVERTER DEFIBRILLATOR IN SITU: ICD-10-CM

## 2021-06-24 PROCEDURE — 93284 PRGRMG EVAL IMPLANTABLE DFB: CPT | Performed by: NURSE PRACTITIONER

## 2021-06-24 PROCEDURE — 93000 ELECTROCARDIOGRAM COMPLETE: CPT | Performed by: NURSE PRACTITIONER

## 2021-06-24 PROCEDURE — 99214 OFFICE O/P EST MOD 30 MIN: CPT | Performed by: NURSE PRACTITIONER

## 2021-06-24 NOTE — PROGRESS NOTES
Cardiac Electrophysiology Outpatient Follow Up Note            [facility] at [facility]    Follow Up Office Visit      Omero Holland  [id number]  06/24/2021    Primary Care Physician: Dimitrios Morales III, MD    Referred By: No ref. provider found    Subjective     Chief Complaint:   Diagnoses and all orders for this visit:    1. Chronic systolic (congestive) heart failure (CMS/HCC) (Primary)  -     Adult Transthoracic Echo Complete W/ Cont if Necessary Per Protocol; Future    2. Permanent atrial fibrillation (CMS/HCC)    3. Chronic systolic congestive heart failure (CMS/HCC)    4. Biventricular automatic implantable cardioverter defibrillator in situ    5. Chronic fatigue    6. Dyspnea on exertion      Chief Complaint   Patient presents with   • Atrial Fibrillation     Problem List:  1. Mixed CM        A)CM dating back to 2007 EF 30% by [facility]       B)[facility] ostial CX BMS 2016        C)MPS 2/17 EF 25% Inferior ischemia        D)[facility] LAD BMS 3/17        E)Echocardiogram EF 35% 5/17        F)Echocardiogram EF 25% 6/16/17        G)MUGA  7/25/17 EF 35%        H)Chronic class III SHF with admission to [facility] in setting of PAF with RVR, invitation of Amiodarone and diuresis.  Cozaar stopped secondary to Hypotension and kidney issues       I ) s/p BIV ICD [employer]     2. Hx of remote Aflutter RFA 2013   3. Frequent PVC's/LBBB   4. HTN   5. HLD   6. LOIS/CPAP   7. BPH/Elevated PSA followed by Dr. Flynn   8. Hypothyroidism   9. Atrial fibrillation              A. Started on amiodarone              B. CHADSVASC = , on coumadin       History of Present Illness:   Omero Holland is a 80 y.o. male who presents to my electrophysiology clinic for follow up of his ischemic cardiomyopathy and chronic systolic heart failure now status post biventricular ICD implant in 2017.  Upon presentation patient reports progressively worsening NYHA class IV systolic heart failure  symptomology with daily fatigue, shortness of breath, and dyspnea on minimal exertion such that he has to stop 3 times to get to his mailbox which is approximately 100 feet from his house.  Patient reports daily bilateral lower extremity edema, orthopnea, and PND.  Patient's last documented echocardiogram was in 2017 with EF 25% followed by MUGA scan revealing EF of 35%.  Patient admits to compliance to guideline directed medical therapy that has been greatly limited due to patient's hypotension.  Patient noted on chronic Coumadin therapy for history of atrial fibrillation with stable INR is without noted side effects, signs of bleeding, or TIA/strokelike symptoms.  Patient noted to be status post AV node ablation December 2019 and continued on amiodarone therapy.  Patient reports compliance to medical therapy without noted side effects.  Patient's blood pressure is acceptable in office today with reported history of hypotension with increasing of his guideline directed medical therapy.    Review of Systems:   Constitutional: No fevers or chills, no recent weight gain or weight loss, + weakness, fatigue  Eyes: No visual loss, blurred vision, double vision, yellow sclerae.  ENT: No headaches, hearing loss, vertigo, congestion or sore throat.   Cardiovascular: Per HPI  Respiratory: No cough or wheezing, no sputum production, no hematemesis,   + SOA, JOYNER  Gastrointestinal: No abdominal pain, no nausea, vomiting, constipation, diarrhea, melena.   Genitourinary: No dysuria, hematuria or increased frequency.  Musculoskeletal:  No gait disturbance, weakness or joint pain or stiffness  Integumentary: No rashes, urticaria, ulcers or sores.   Neurological: No headache, dizziness, syncope, paralysis, ataxia, no prior CVA/TIA  Psychiatric: No anxiety, or depression  Endocrine: No diaphoresis, cold or heat intolerance. No polyuria or polydipsia.   Hematologic/Lymphatic: No anemia, abnormal bruising or bleeding. No history of  DVT/PE.     Past Medical History:   Past Medical History:   Diagnosis Date   • A-fib (CMS/HCC)    • Arthritis    • Cardiomyopathy (CMS/HCC)     Non-ischemic   • Coronary artery disease     2 stents   • Diabetes mellitus (CMS/HCC)     dx 3- 4 years ago- checks fsbs daily   • Dizziness    • Dyslipidemia    • Hyperlipidemia    • Hypertension     H/o   • Kidney disease     HX- sees a nephrologist   • Melanoma (CMS/HCC)    • Obesity    • LOIS (obstructive sleep apnea)     cpap   • PAF (paroxysmal atrial fibrillation) (CMS/HCC) 8/21/2017    /atrial flutter   • Palpitations     Tachy Palpitations   • Psoriatic arthritis (CMS/HCC)    • Systolic heart failure (CMS/HCC)     Chronic class II   • Wears glasses     reading       Past Surgical History:   Past Surgical History:   Procedure Laterality Date   • CARDIAC ABLATION     • CARDIAC CATHETERIZATION Left 2007    2 stents   • CARDIAC ELECTROPHYSIOLOGY PROCEDURE N/A 9/27/2017    Procedure: Implant ICD - bi ventricular;  Surgeon: Evan Leroy DO;  Location:  [facility];  Service:    • CARDIAC ELECTROPHYSIOLOGY PROCEDURE N/A 12/12/2019    Procedure: AV node ablation- hold coumadin one day;  Surgeon: Laci Wiley MD;  Location:  [facility];  Service: Cardiovascular   • COLONOSCOPY      > 10 years ago   • HIP SURGERY  2020   • LIVER BIOPSY      x 3   • OTHER SURGICAL HISTORY      Melanoma removal of back    • PROSTATE BIOPSY     • REPLACEMENT TOTAL KNEE BILATERAL Bilateral        Family History:   Family History   Problem Relation Age of Onset   • Cancer Mother    • Heart attack Father        Social History:   Social History     Socioeconomic History   • Marital status:      Spouse name: Not on file   • Number of children: Not on file   • Years of education: Not on file   • Highest education level: Not on file   Tobacco Use   • Smoking status: Never Smoker   • Smokeless tobacco: Never Used   Substance and Sexual Activity   • Alcohol  "use: No   • Drug use: No   • Sexual activity: Defer       Medications:     Current Outpatient Medications:   •  allopurinol (ZYLOPRIM) 100 MG tablet, Take 100 mg by mouth Daily., Disp: , Rfl:   •  atorvastatin (LIPITOR) 40 MG tablet, Take 40 mg by mouth Daily., Disp: , Rfl:   •  carvedilol (COREG) 12.5 MG tablet, Take 12.5 mg by mouth 2 (Two) Times a Day With Meals. 1.5 TABLET TWICE DAILY, Disp: , Rfl:   •  finasteride (PROSCAR) 5 MG tablet, Take 5 mg by mouth Every Morning., Disp: , Rfl:   •  glimepiride (AMARYL) 2 MG tablet, Take 2 mg by mouth Every Morning Before Breakfast., Disp: , Rfl:   •  magnesium oxide (MAGOX) 400 (241.3 Mg) MG tablet tablet, Take 400 mg by mouth 2 (Two) Times a Day., Disp: , Rfl:   •  metFORMIN (GLUCOPHAGE) 1000 MG tablet, Take 1,000 mg by mouth Daily With Breakfast., Disp: , Rfl:   •  metOLazone (ZAROXOLYN) 2.5 MG tablet, Take 2.5 mg by mouth As Needed., Disp: , Rfl:   •  POTASSIUM CHLORIDE PO, Take 40 mEq by mouth 2 (Two) Times a Day., Disp: , Rfl:   •  torsemide (DEMADEX) 100 MG tablet, Take 50 mg by mouth 2 (Two) Times a Day., Disp: , Rfl:   •  warfarin (COUMADIN) 6 MG tablet, Take 6 mg by mouth Daily. 6 mg every day and 3 on thurs., Disp: , Rfl:     Allergies:   Allergies   Allergen Reactions   • Codeine      Stomach pain       Objective   Vital Signs:   Vitals:    06/24/21 1022   BP: 116/78   BP Location: Left arm   Patient Position: Sitting   Pulse: 76   SpO2: 96%   Weight: 111 kg (244 lb)   Height: 177.8 cm (70\")       PHYSICAL EXAM  General appearance: Awake, alert, cooperative  Head: Normocephalic, without obvious abnormality, atraumatic  Eyes: Conjunctivae/corneas clear, EOMs intact  Neck: no adenopathy, no carotid bruit, no JVD and thyroid: not enlarged  Lungs: clear to auscultation bilaterally and no rhonchi or crackles\", ' symmetric  Heart: regular rate and rhythm, S1, S2 normal, no murmur, click, rub or gallop  Abdomen: Soft, non-tender, bowel sounds normal,  no " organomegaly  Extremities: extremities normal, atraumatic, no cyanosis or edema  Skin: Skin color, turgor normal, no rashes or lesions  Neurologic: Grossly normal     Lab Results   Component Value Date    GLUCOSE 158 (H) 12/12/2019    CALCIUM 9.5 12/12/2019     12/12/2019    K 3.4 (L) 12/12/2019    CO2 31.0 (H) 12/12/2019    CL 93 (L) 12/12/2019    BUN 32 (H) 12/12/2019    CREATININE 1.48 (H) 12/12/2019    EGFRIFAFRI 56 (L) 12/12/2019    EGFRIFNONA 46 (L) 12/12/2019    BCR 21.6 12/12/2019    ANIONGAP 14.0 12/12/2019     Lab Results   Component Value Date    WBC 10.04 12/12/2019    HGB 13.6 12/12/2019    HCT 42.3 12/12/2019    MCV 96.8 12/12/2019     12/12/2019     Lab Results   Component Value Date    INR 2.09 (H) 12/12/2019    INR 1.54 09/28/2017    INR 1.59 09/26/2017    PROTIME 22.6 (H) 12/12/2019    PROTIME 17.0 (H) 09/28/2017    PROTIME 17.6 (H) 09/26/2017       Cardiac Testing:     I personally viewed and interpreted the patient's EKG/Telemetry/lab data      ECG 12 Lead    Date/Time: 6/24/2021 11:26 AM  Performed by: Jordan Raya APRN  Authorized by: Jordan Raya APRN   Comparison: compared with previous ECG from 12/10/2020  Rhythm: paced  BPM: 75            Assessment & Plan    Diagnoses and all orders for this visit:    1. Chronic systolic (congestive) heart failure (CMS/HCC) (Primary)  -     Adult Transthoracic Echo Complete W/ Cont if Necessary Per Protocol; Future    2. Permanent atrial fibrillation (CMS/HCC)    3. Chronic systolic congestive heart failure (CMS/HCC)    4. Biventricular automatic implantable cardioverter defibrillator in situ    5. Chronic fatigue    6. Dyspnea on exertion         Diagnosis Plan   1. Chronic systolic (congestive) heart failure (CMS/HCC)  .  Patient reports progressive increase in NYHA class IV symptomology with medical therapy limited due to history of symptomatic hypotension.  Patient currently reports dizziness with standing and bending  over on a regular basis.  Patient's last echocardiogram/MUGA performed in 2017 per records reviewed.  Will reassess patient's LV function.  Patient reports scheduled follow-up with general cardiology in September 2021.    Adult Transthoracic Echo Complete W/ Cont if Necessary Per Protocol     2. Permanent atrial fibrillation (CMS/HCC)  .  Patient with documented permanent atrial fibrillation now status post AV node ablation.  Patient noted to be continued on amiodarone therapy now for approximately 5 years per patient report.  Recommend that patient discontinue amiodarone therapy at this time.  Follow-up in 6 months for reevaluation.  Patient is noted with underlying atrial fibrillation with controlled ventricular rates.  Patient may benefit from a second AV node ablation in future.  We will continue to monitor.          3. Biventricular automatic implantable cardioverter defibrillator in situ  .  Device interrogation: [employer] biventricular ICD at DDDR 70 ppm, RA paced 1%, RV paced 85%, LV paced 89%, acceptable lead threshold impedance with noted slightly increased RV lead threshold on interrogation in office today.  Battery voltage with 2 years remaining, charge time 11 seconds, underlying atrial fibrillation with 100% mode switch rate controlled.      This patient's Cardiac Implanted Electronic Device was manually interrogated and reprogrammed during the patient encounter today.  Iterative programming changes were manually made to determine the sensing threshold, pacing threshold, lead impedance as well as underlying cardiac rhythm.  These programming changes were not limited to but included some or all of the following when appropriate: pacing mode, programmed AV delays, blanking periods, and refractory periods.  Data obtained as a result of these manual programing changes informed the patient's CIED permanent programming.       4. Chronic fatigue   Echocardiogram     5. Dyspnea on exertion    Echocardiogram     Body mass index is 35.01 kg/m².    I spent 51 minutes in consultation with this patient which included more than 65% of this time in direct face-to-face counseling, physical examination and discussion of my assessment and findings and shared decision making with the patient.  The remainder of the time not spent face to face was performing one, some or all of the following actions:  preparing to see this patient ( eg. Review of tests),  ordering medications, tests or procedures ), care coordination, discussion of the plan with other healthcare providers, documenting clinical information in Epic well as independently interpreting results and communicating results to patient, family and or caregiver.  All time noted occurred on the date of service.    Follow Up: 6-month follow-up with [employer] device check    Thank you for allowing me to participate in the care of your patient. Please to not hesitate to contact me with additional questions or concerns.     Electronically signed by COLE Crabtree, 06/24/21, 11:26 AM EDT.   [facility] / [facility]

## 2021-09-12 PROCEDURE — 93295 DEV INTERROG REMOTE 1/2/MLT: CPT | Performed by: INTERNAL MEDICINE

## 2021-09-12 PROCEDURE — 93296 REM INTERROG EVL PM/IDS: CPT | Performed by: INTERNAL MEDICINE

## 2021-12-12 PROCEDURE — 93296 REM INTERROG EVL PM/IDS: CPT | Performed by: INTERNAL MEDICINE

## 2021-12-12 PROCEDURE — 93295 DEV INTERROG REMOTE 1/2/MLT: CPT | Performed by: INTERNAL MEDICINE

## 2022-02-24 ENCOUNTER — OFFICE VISIT (OUTPATIENT)
Dept: CARDIOLOGY | Facility: CLINIC | Age: 82
End: 2022-02-24

## 2022-02-24 VITALS
BODY MASS INDEX: 30.78 KG/M2 | DIASTOLIC BLOOD PRESSURE: 52 MMHG | WEIGHT: 215 LBS | HEIGHT: 70 IN | OXYGEN SATURATION: 100 % | SYSTOLIC BLOOD PRESSURE: 106 MMHG | HEART RATE: 62 BPM

## 2022-02-24 DIAGNOSIS — I48.21 PERMANENT ATRIAL FIBRILLATION: Primary | ICD-10-CM

## 2022-02-24 DIAGNOSIS — G47.33 OSA (OBSTRUCTIVE SLEEP APNEA): ICD-10-CM

## 2022-02-24 DIAGNOSIS — I50.22 CHRONIC SYSTOLIC CONGESTIVE HEART FAILURE: ICD-10-CM

## 2022-02-24 PROCEDURE — 93284 PRGRMG EVAL IMPLANTABLE DFB: CPT | Performed by: STUDENT IN AN ORGANIZED HEALTH CARE EDUCATION/TRAINING PROGRAM

## 2022-02-24 PROCEDURE — 99214 OFFICE O/P EST MOD 30 MIN: CPT | Performed by: STUDENT IN AN ORGANIZED HEALTH CARE EDUCATION/TRAINING PROGRAM

## 2022-02-24 RX ORDER — DULOXETIN HYDROCHLORIDE 30 MG/1
30 CAPSULE, DELAYED RELEASE ORAL DAILY
COMMUNITY
End: 2023-03-23

## 2022-02-24 RX ORDER — VALSARTAN 40 MG/1
40 TABLET ORAL DAILY
COMMUNITY

## 2022-02-24 RX ORDER — SPIRONOLACTONE 50 MG/1
50 TABLET, FILM COATED ORAL DAILY
COMMUNITY

## 2022-02-24 RX ORDER — GABAPENTIN 600 MG/1
600 TABLET ORAL 3 TIMES DAILY
COMMUNITY

## 2022-02-24 NOTE — PROGRESS NOTES
[facility] at [facility] - Office Note  Omero Holland         [address] [facility] [address]  1940   [phone] (home)        Location:  [place] office.  Visit Type: Follow Up.    02/24/22     PCP:  Dimitrios Morales III, MD    Identification:  Omero Holland is a 81 y.o.  male       Chief Complaint: Atrial fibrillation, device check    Problem List:  1. Mixed CM        A)CM dating back to 2007 EF 30% by [facility]       B)[facility] ostial CX BMS 2016        C)MPS 2/17 EF 25% Inferior ischemia        D)C LAD BMS 3/17        E)Echocardiogram EF 35% 5/17        F)Echocardiogram EF 25% 6/16/17        G)MUGA  7/25/17 EF 35%        H)Chronic class III SHF with admission to [facility] in setting of PAF with RVR, invitation of Amiodarone and diuresis.  Cozaar stopped secondary to Hypotension and kidney issues       I ) s/p BIV ICD [employer] [facility] 2017       J) Echo 07/14/2021: EF 35% Left ventricular diastolic function is consistent with (grade Ia w/high LAP) impaired relaxation. Moderate to severe TR. RVSP > 55. LV mild to moderately dilated. RV mild to moderate dilation, reduced function. Moderate MR.      K ) 12/2021: Hospitalization for fluid overload, was up 20 lbs.  2. Hx of remote Aflutter RFA 2013   3. Frequent PVC's/LBBB   4. HTN   5. HLD   6. LOIS/CPAP   7. BPH/Elevated PSA followed by Dr. Flynn   8. Hypothyroidism   9. Atrial fibrillation              A. Formerly on amiodarone              B. CHADSVASC = 4, on coumadin   C. AV node ablation, BiV ICD implant 2017  10. Vertigo   A. Fell early February 2022, one night hospital stay at [facility]  11. Diabetes Mellitus      Allergies   Allergen Reactions   • Codeine Other (See Comments)     Stomach pain         Current Outpatient Medications:   •  atorvastatin (LIPITOR) 40 MG tablet, Take 40 mg by mouth Daily., Disp: , Rfl:   •  carvedilol (COREG) 12.5 MG tablet, Take 12.5 mg by mouth 2 (Two) Times a Day With Meals. 1.5  TABLET TWICE DAILY, Disp: , Rfl:   •  DULoxetine (CYMBALTA) 30 MG capsule, Take 30 mg by mouth Daily., Disp: , Rfl:   •  finasteride (PROSCAR) 5 MG tablet, Take 5 mg by mouth Every Morning., Disp: , Rfl:   •  gabapentin (NEURONTIN) 600 MG tablet, Take 600 mg by mouth 3 (Three) Times a Day., Disp: , Rfl:   •  magnesium oxide (MAGOX) 400 (241.3 Mg) MG tablet tablet, Take 400 mg by mouth 2 (Two) Times a Day., Disp: , Rfl:   •  metFORMIN (GLUCOPHAGE) 1000 MG tablet, Take 1,000 mg by mouth Daily With Breakfast., Disp: , Rfl:   •  spironolactone (ALDACTONE) 50 MG tablet, Take 50 mg by mouth Daily., Disp: , Rfl:   •  torsemide (DEMADEX) 100 MG tablet, Take 50 mg by mouth 2 (Two) Times a Day., Disp: , Rfl:   •  valsartan (DIOVAN) 40 MG tablet, Take 40 mg by mouth Daily., Disp: , Rfl:   •  warfarin (COUMADIN) 6 MG tablet, Take 6 mg by mouth Daily. 6 mg every day and 3 on thurs., Disp: , Rfl:     HPI  Omero Holland is a 81 y.o. male patient here for follow up on atrial fibrillation and a device check. Pt feels he is doing well but upon questioning he c/o shortness of breath with activity and general fatigue, he does sleep with his head elevation. He does continue to have heart failure symptoms. He has had to modify his activities because of this. Denies chest pain, palpitations, lower extremity edema, PND. Last echo shows EF 35%. Was hospitalized around [place] for fluid overload that he believes was related to the holiday meals he was enjoying. He was up 20 lbs at this time and was diuresed extensively during his stay.    The following portions of the patient's history were reviewed in the chart and updated as appropriate: allergies, current medications, past family history, past medical history, past social history, past surgical history and problem list.    Review of Systems   Cardiovascular: Positive for dyspnea on exertion and orthopnea. Negative for chest pain, irregular heartbeat, leg swelling, paroxysmal  "nocturnal dyspnea and syncope.   Respiratory: Negative for sleep disturbances due to breathing.    Hematologic/Lymphatic: Negative for bleeding problem.   Gastrointestinal: Negative for change in bowel habit.   Genitourinary: Negative for hematuria.         height is 177.8 cm (70\") and weight is 97.5 kg (215 lb). His blood pressure is 106/52 and his pulse is 62. His oxygen saturation is 100%.   Vitals and nursing note reviewed.   Constitutional:       Appearance: Not in distress.   Pulmonary:      Effort: Pulmonary effort is normal.      Breath sounds: Normal breath sounds.   Cardiovascular:      Normal rate.      Murmurs: There is a grade 2/6 systolic murmur.      No gallop. No click. No rub.   Edema:     Peripheral edema absent.   Skin:     General: Skin is warm and dry.   Neurological:      Mental Status: Alert and oriented to person, place and time.       Procedures   Device check   [employer] BiV ICD  A pacing <1 %, p wave 1.6  Rv pacing 75% r wave= 10.3  LV pacing 89%, R >25  Normal threshold and impedances  Battery voltage 1.5 years, charge time 11.6 seconds  100% atrial fibrillation. No high ventricular rate events.    Assessment/ Plan   Diagnoses and all orders for this visit:    1. Permanent atrial fibrillation (HCC) (Primary)  - [id number]= 4, on coumadin. INR checks at his general cardiologist.  - 100% atrial fibrillation, asymptomatic with this. Off AA medications. Rate controlled with carvedilol.    2. Chronic systolic congestive heart failure (HCC)  - We discussed the possibility of a re-do AV node ablation to to allow for a higher percentage of biventricular pacing and hopefully relieve some of his symptoms of fatigue and activity intolerance. He will think about this possibility and call the office with his decision.   - Continue current regimen.    3. LOIS (obstructive sleep apnea)  - stopped using CPAP recently, encouraged him to restart nightly use.    Follow up in 6 months or earlier as " needed. We will wait for his decision on the re-do ablation.      COLE Amaro working with Dr. Amilcar ARAUJO, Esteban Fitzgerald MD, personally performed the services described in this documentation as scribed by the above named individual in my presence, and it is both accurate and complete.  2/25/2022  14:12 EST

## 2022-03-02 ENCOUNTER — TELEPHONE (OUTPATIENT)
Dept: CARDIOLOGY | Facility: CLINIC | Age: 82
End: 2022-03-02

## 2022-03-02 NOTE — TELEPHONE ENCOUNTER
Spoke with patient regarding his home monitor not sending in scheduled reading. Had patient manually send in reading. Had to hang up with patient when the tower light came on because the monitor is connected with his home phone line. Offered to order him a cell adapter that he can use instead of the home phone line. He was agreeable to this.

## 2022-03-13 PROCEDURE — 93295 DEV INTERROG REMOTE 1/2/MLT: CPT | Performed by: STUDENT IN AN ORGANIZED HEALTH CARE EDUCATION/TRAINING PROGRAM

## 2022-03-13 PROCEDURE — 93296 REM INTERROG EVL PM/IDS: CPT | Performed by: STUDENT IN AN ORGANIZED HEALTH CARE EDUCATION/TRAINING PROGRAM

## 2022-08-25 ENCOUNTER — OFFICE VISIT (OUTPATIENT)
Dept: CARDIOLOGY | Facility: CLINIC | Age: 82
End: 2022-08-25

## 2022-08-25 VITALS
BODY MASS INDEX: 36.65 KG/M2 | OXYGEN SATURATION: 98 % | WEIGHT: 241.8 LBS | HEIGHT: 68 IN | HEART RATE: 70 BPM | DIASTOLIC BLOOD PRESSURE: 56 MMHG | SYSTOLIC BLOOD PRESSURE: 122 MMHG

## 2022-08-25 DIAGNOSIS — Z95.810 BIVENTRICULAR AUTOMATIC IMPLANTABLE CARDIOVERTER DEFIBRILLATOR IN SITU: ICD-10-CM

## 2022-08-25 DIAGNOSIS — I48.21 PERMANENT ATRIAL FIBRILLATION: Primary | ICD-10-CM

## 2022-08-25 DIAGNOSIS — I42.9 CARDIOMYOPATHY, UNSPECIFIED TYPE: ICD-10-CM

## 2022-08-25 PROBLEM — N18.30 STAGE 3 CHRONIC KIDNEY DISEASE (HCC): Status: ACTIVE | Noted: 2022-01-17

## 2022-08-25 PROBLEM — E87.6 HYPOKALEMIA: Status: ACTIVE | Noted: 2022-01-17

## 2022-08-25 PROBLEM — E79.0 HYPERURICEMIA: Status: ACTIVE | Noted: 2022-01-17

## 2022-08-25 PROBLEM — N45.1 EPIDIDYMITIS: Status: ACTIVE | Noted: 2021-01-25

## 2022-08-25 PROBLEM — E83.42 HYPOMAGNESEMIA: Status: ACTIVE | Noted: 2022-01-17

## 2022-08-25 PROBLEM — C61 ADENOCARCINOMA OF PROSTATE (HCC): Status: ACTIVE | Noted: 2021-04-15

## 2022-08-25 PROBLEM — I50.9 ACUTE CONGESTIVE HEART FAILURE (HCC): Status: ACTIVE | Noted: 2017-11-07

## 2022-08-25 PROBLEM — R31.29 MICROSCOPIC HEMATURIA: Status: ACTIVE | Noted: 2021-04-15

## 2022-08-25 PROBLEM — N25.81 SECONDARY HYPERPARATHYROIDISM (HCC): Status: ACTIVE | Noted: 2022-01-17

## 2022-08-25 PROBLEM — E66.9 OBESITY WITH BODY MASS INDEX 30 OR GREATER: Status: ACTIVE | Noted: 2017-11-07

## 2022-08-25 PROBLEM — K81.9 CHOLECYSTITIS: Status: ACTIVE | Noted: 2021-04-23

## 2022-08-25 PROBLEM — G62.9 NEUROPATHY: Status: ACTIVE | Noted: 2022-01-17

## 2022-08-25 PROBLEM — E11.9 DIABETES MELLITUS (HCC): Status: ACTIVE | Noted: 2022-01-17

## 2022-08-25 PROBLEM — R60.0 EDEMA OF EXTREMITY: Status: ACTIVE | Noted: 2022-01-17

## 2022-08-25 PROBLEM — I25.10 CORONARY ARTERIOSCLEROSIS IN NATIVE ARTERY: Status: ACTIVE | Noted: 2017-02-10

## 2022-08-25 PROBLEM — R97.20 RAISED PROSTATE SPECIFIC ANTIGEN: Status: ACTIVE | Noted: 2017-02-10

## 2022-08-25 PROBLEM — B35.6 TINEA CRURIS: Status: ACTIVE | Noted: 2017-11-09

## 2022-08-25 PROCEDURE — 99214 OFFICE O/P EST MOD 30 MIN: CPT | Performed by: STUDENT IN AN ORGANIZED HEALTH CARE EDUCATION/TRAINING PROGRAM

## 2022-08-25 PROCEDURE — 93284 PRGRMG EVAL IMPLANTABLE DFB: CPT | Performed by: STUDENT IN AN ORGANIZED HEALTH CARE EDUCATION/TRAINING PROGRAM

## 2022-08-25 RX ORDER — GLIPIZIDE 2.5 MG/1
2.5 TABLET, EXTENDED RELEASE ORAL DAILY
COMMUNITY
Start: 2022-07-06

## 2022-08-25 RX ORDER — MAGNESIUM OXIDE TAB 400 MG (241.3 MG ELEMENTAL MG) 400 (241.3 MG) MG
1 TAB ORAL 2 TIMES DAILY
COMMUNITY
Start: 2022-06-06 | End: 2022-08-25

## 2022-08-25 RX ORDER — INSULIN DEGLUDEC INJECTION 100 U/ML
28 INJECTION, SOLUTION SUBCUTANEOUS DAILY
COMMUNITY

## 2022-08-25 NOTE — PROGRESS NOTES
[facility] - Office Note  Omero Holland         [address] [facility] [address]  1940   [phone] (home)        Location:  [facility].  Visit Type: Follow Up.    08/25/22     PCP:  Dimitrios Morales III, MD    Identification:  Oemro Holland is a 81 y.o.  male       Chief Complaint: Atrial fibrillation, device check    Problem List:  1. Mixed CM        A)CM dating back to 2007 EF 30% by [facility]       B)[facility] ostial CX BMS 2016        C)MPS 2/17 EF 25% Inferior ischemia        D)C LAD BMS 3/17        E)Echocardiogram EF 35% 5/17        F)Echocardiogram EF 25% 6/16/17        G)MUGA  7/25/17 EF 35%        H)Chronic class III SHF with admission to [facility] in setting of PAF with RVR, invitation of Amiodarone and diuresis.  Cozaar stopped secondary to Hypotension and kidney issues       I ) s/p BIV ICD [employer]â€™[employer] 2017       J) Echo 07/14/2021: EF 35% Left ventricular diastolic function is consistent with (grade Ia w/high LAP) impaired relaxation. Moderate to severe TR. RVSP > 55. LV mild to moderately dilated. RV mild to moderate dilation, reduced function. Moderate MR.      K ) 12/2021: Hospitalization for fluid overload, was up 20 lbs.  2. Hx of remote Aflutter RFA 2013   3. Frequent PVC's/LBBB   4. HTN   5. HLD   6. LOIS/CPAP   7. BPH/Elevated PSA followed by Dr. Flynn   8. Hypothyroidism   9. Atrial fibrillation              A. Formerly on amiodarone              B. CHADSVASC = 4, on coumadin   C. AV node ablation, BiV ICD implant 2017  10. Vertigo   A. Fell early February 2022, one night hospital stay at [facility]  11. Diabetes Mellitus      Allergies   Allergen Reactions   • Codeine Other (See Comments)     Stomach pain         Current Outpatient Medications:   •  atorvastatin (LIPITOR) 40 MG tablet, Take 40 mg by mouth Daily., Disp: , Rfl:   •  carvedilol (COREG) 12.5 MG tablet, Take 12.5 mg by mouth 2 (Two) Times a Day With Meals. 1.5  "TABLET TWICE DAILY, Disp: , Rfl:   •  DULoxetine (CYMBALTA) 30 MG capsule, Take 30 mg by mouth Daily., Disp: , Rfl:   •  finasteride (PROSCAR) 5 MG tablet, Take 5 mg by mouth Every Morning., Disp: , Rfl:   •  gabapentin (NEURONTIN) 600 MG tablet, Take 600 mg by mouth 3 (Three) Times a Day., Disp: , Rfl:   •  glipizide (GLUCOTROL XL) 2.5 MG 24 hr tablet, Take 2.5 mg by mouth Daily., Disp: , Rfl:   •  Insulin Degludec (Tresiba) 100 UNIT/ML solution injection, Inject 22 Units under the skin into the appropriate area as directed., Disp: , Rfl:   •  magnesium oxide (MAGOX) 400 (241.3 Mg) MG tablet tablet, Take 400 mg by mouth 2 (Two) Times a Day., Disp: , Rfl:   •  metFORMIN (GLUCOPHAGE) 1000 MG tablet, Take 1,000 mg by mouth Daily With Breakfast., Disp: , Rfl:   •  spironolactone (ALDACTONE) 50 MG tablet, Take 50 mg by mouth Daily., Disp: , Rfl:   •  torsemide (DEMADEX) 100 MG tablet, Take 50 mg by mouth 2 (Two) Times a Day., Disp: , Rfl:   •  valsartan (DIOVAN) 40 MG tablet, Take 40 mg by mouth Daily., Disp: , Rfl:   •  warfarin (COUMADIN) 6 MG tablet, Take 6 mg by mouth Daily. 6 mg every day and 3 on thurs., Disp: , Rfl:     HPI  Omero Holland is a 81 y.o. male patient here for follow up on atrial fibrillation and a device check.  He is overall doing well since his last visit.  He has no new complaints.  Denies any problems with his defibrillator site.  He is undergoing radiation for prostate cancer currently and is almost done with that.  He denies chest pain, shortness of breath, dyspnea on exertion, palpitations, orthopnea, PND, or lower extremity swelling.          height is 172.7 cm (68\") and weight is 110 kg (241 lb 12.8 oz). His blood pressure is 122/56 and his pulse is 70. His oxygen saturation is 98%.   Vitals and nursing note reviewed.   Constitutional:       Appearance: Not in distress.   Pulmonary:      Effort: Pulmonary effort is normal.      Breath sounds: Normal breath sounds.   Cardiovascular:     "  Normal rate.      Murmurs: There is a grade 2/6 systolic murmur.      No gallop. No click. No rub.   Edema:     Peripheral edema absent.   Skin:     General: Skin is warm and dry.   Neurological:      Mental Status: Alert and oriented to person, place and time.       Procedures   Device check   [employer] BiV ICD  A pacing <1 %, p wave 0.8  Rv pacing 85% r wave= 5.2, THRESHOLD 2.1v@1.0 MS  LV pacing 93%, R >25  Normal threshold and impedances  Battery voltage 1 years, charge time 11.6 seconds  100% atrial fibrillation. No high ventricular rate events.    Assessment/ Plan   Diagnoses and all orders for this visit:    1. Permanent atrial fibrillation (HCC) (Primary)  - [id number]= 4, on coumadin. INR checks at his general cardiologist.  - 100% atrial fibrillation, asymptomatic with this. Off AA medications. Rate controlled with carvedilol.  His primary cardiologist was going to increase his carvedilol and patient wanted to make sure was fine with this.  I am completely in agreement that this would be helpful for him.    2. Chronic systolic congestive heart failure (HCC)  -His most recent echocardiogram shows a persistently reduced ejection fraction around 35%.  His biventricular pacing percentage has increased a little bit since his last visit up to 93% today.  I am uncertain if this is related to PVCs or persistent conduction through his AV node after his AV node ablation.  We will get an EKG on his next visit to reassess this.  At the time of his generator change, we may consider doing a repeat AV ablation if we feel as though his conduction is too rapid.  His ventricular threshold of the right ventricular lead is also significantly elevated.  This is likely going to result in decreased battery longevity going forward we discussed the time of his generator change we can see if it is possible to insert a new RV lead.  I would not recommend an extraction for him but I think the risk would outweigh the  benefits.  - Continue current regimen.    3. LOIS (obstructive sleep apnea)  - stopped using CPAP recently, encouraged him to restart nightly use.    Follow up in 6 months or earlier as needed. We will wait for his decision on the re-do ablation.

## 2022-09-11 PROCEDURE — 93295 DEV INTERROG REMOTE 1/2/MLT: CPT | Performed by: STUDENT IN AN ORGANIZED HEALTH CARE EDUCATION/TRAINING PROGRAM

## 2022-09-11 PROCEDURE — 93296 REM INTERROG EVL PM/IDS: CPT | Performed by: STUDENT IN AN ORGANIZED HEALTH CARE EDUCATION/TRAINING PROGRAM

## 2022-12-11 PROCEDURE — 93295 DEV INTERROG REMOTE 1/2/MLT: CPT | Performed by: STUDENT IN AN ORGANIZED HEALTH CARE EDUCATION/TRAINING PROGRAM

## 2022-12-11 PROCEDURE — 93296 REM INTERROG EVL PM/IDS: CPT | Performed by: STUDENT IN AN ORGANIZED HEALTH CARE EDUCATION/TRAINING PROGRAM

## 2023-01-01 ENCOUNTER — APPOINTMENT (OUTPATIENT)
Dept: ULTRASOUND IMAGING | Facility: HOSPITAL | Age: 83
DRG: 308 | End: 2023-01-01
Payer: MEDICARE

## 2023-01-01 ENCOUNTER — HOSPITAL ENCOUNTER (INPATIENT)
Facility: HOSPITAL | Age: 83
LOS: 3 days | DRG: 308 | End: 2023-12-22
Attending: STUDENT IN AN ORGANIZED HEALTH CARE EDUCATION/TRAINING PROGRAM | Admitting: STUDENT IN AN ORGANIZED HEALTH CARE EDUCATION/TRAINING PROGRAM
Payer: MEDICARE

## 2023-01-01 ENCOUNTER — APPOINTMENT (OUTPATIENT)
Dept: GENERAL RADIOLOGY | Facility: HOSPITAL | Age: 83
DRG: 308 | End: 2023-01-01
Payer: MEDICARE

## 2023-01-01 ENCOUNTER — APPOINTMENT (OUTPATIENT)
Dept: CT IMAGING | Facility: HOSPITAL | Age: 83
DRG: 308 | End: 2023-01-01
Payer: MEDICARE

## 2023-01-01 ENCOUNTER — APPOINTMENT (OUTPATIENT)
Dept: CARDIOLOGY | Facility: HOSPITAL | Age: 83
DRG: 308 | End: 2023-01-01
Payer: MEDICARE

## 2023-01-01 DIAGNOSIS — R09.02 HYPOXIA: Primary | ICD-10-CM

## 2023-01-01 LAB
ALBUMIN SERPL-MCNC: 3.4 G/DL (ref 3.5–5.2)
ALBUMIN/GLOB SERPL: 0.9 G/DL
ALP SERPL-CCNC: 85 U/L (ref 39–117)
ALT SERPL W P-5'-P-CCNC: 13 U/L (ref 1–41)
ANION GAP SERPL CALCULATED.3IONS-SCNC: 15 MMOL/L (ref 5–15)
ANION GAP SERPL CALCULATED.3IONS-SCNC: 15 MMOL/L (ref 5–15)
ANION GAP SERPL CALCULATED.3IONS-SCNC: 17 MMOL/L (ref 5–15)
ANION GAP SERPL CALCULATED.3IONS-SCNC: 17 MMOL/L (ref 5–15)
ANION GAP SERPL CALCULATED.3IONS-SCNC: 20 MMOL/L (ref 5–15)
ANION GAP SERPL CALCULATED.3IONS-SCNC: 20 MMOL/L (ref 5–15)
ANION GAP SERPL CALCULATED.3IONS-SCNC: 22 MMOL/L (ref 5–15)
APTT PPP: 43.8 SECONDS (ref 22–39)
AST SERPL-CCNC: 19 U/L (ref 1–40)
BACTERIA UR QL AUTO: NORMAL /HPF
BASOPHILS # BLD AUTO: 0 10*3/MM3 (ref 0–0.2)
BASOPHILS # BLD AUTO: 0 10*3/MM3 (ref 0–0.2)
BASOPHILS # BLD AUTO: 0.01 10*3/MM3 (ref 0–0.2)
BASOPHILS NFR BLD AUTO: 0 % (ref 0–1.5)
BASOPHILS NFR BLD AUTO: 0 % (ref 0–1.5)
BASOPHILS NFR BLD AUTO: 0.3 % (ref 0–1.5)
BH CV ECHO MEAS - AO ROOT DIAM: 3.7 CM
BH CV ECHO MEAS - EDV(CUBED): 148.9 ML
BH CV ECHO MEAS - EDV(MOD-SP2): 233 ML
BH CV ECHO MEAS - EDV(MOD-SP4): 302 ML
BH CV ECHO MEAS - EF(MOD-BP): 22.1 %
BH CV ECHO MEAS - EF(MOD-SP2): 36.1 %
BH CV ECHO MEAS - EF(MOD-SP4): 11.3 %
BH CV ECHO MEAS - ESV(CUBED): 110.6 ML
BH CV ECHO MEAS - ESV(MOD-SP2): 149 ML
BH CV ECHO MEAS - ESV(MOD-SP4): 268 ML
BH CV ECHO MEAS - FS: 9.4 %
BH CV ECHO MEAS - IVS/LVPW: 1 CM
BH CV ECHO MEAS - IVSD: 1.5 CM
BH CV ECHO MEAS - LA DIMENSION: 5.7 CM
BH CV ECHO MEAS - LAT PEAK E' VEL: 9.4 CM/SEC
BH CV ECHO MEAS - LV MASS(C)D: 352.5 GRAMS
BH CV ECHO MEAS - LV MAX PG: 0.75 MMHG
BH CV ECHO MEAS - LV MEAN PG: 0 MMHG
BH CV ECHO MEAS - LV V1 MAX: 43.3 CM/SEC
BH CV ECHO MEAS - LV V1 VTI: 7.1 CM
BH CV ECHO MEAS - LVIDD: 5.3 CM
BH CV ECHO MEAS - LVIDS: 4.8 CM
BH CV ECHO MEAS - LVOT AREA: 3.8 CM2
BH CV ECHO MEAS - LVOT DIAM: 2.2 CM
BH CV ECHO MEAS - LVPWD: 1.5 CM
BH CV ECHO MEAS - MED PEAK E' VEL: 6.6 CM/SEC
BH CV ECHO MEAS - MR MAX PG: 83.2 MMHG
BH CV ECHO MEAS - MR MAX VEL: 456 CM/SEC
BH CV ECHO MEAS - MR MEAN PG: 52.5 MMHG
BH CV ECHO MEAS - MR MEAN VEL: 340.5 CM/SEC
BH CV ECHO MEAS - MR VTI: 146 CM
BH CV ECHO MEAS - MV A MAX VEL: 98.1 CM/SEC
BH CV ECHO MEAS - MV DEC SLOPE: 411 CM/SEC2
BH CV ECHO MEAS - MV E MAX VEL: 46.7 CM/SEC
BH CV ECHO MEAS - MV E/A: 0.48
BH CV ECHO MEAS - MV MAX PG: 5.7 MMHG
BH CV ECHO MEAS - MV MEAN PG: 3 MMHG
BH CV ECHO MEAS - MV P1/2T: 83.4 MSEC
BH CV ECHO MEAS - MV V2 VTI: 24.9 CM
BH CV ECHO MEAS - MVA(P1/2T): 2.6 CM2
BH CV ECHO MEAS - MVA(VTI): 1.09 CM2
BH CV ECHO MEAS - PA ACC TIME: 0.06 SEC
BH CV ECHO MEAS - RAP SYSTOLE: 15 MMHG
BH CV ECHO MEAS - RVSP: 60 MMHG
BH CV ECHO MEAS - SV(LVOT): 27.1 ML
BH CV ECHO MEAS - SV(MOD-SP2): 84 ML
BH CV ECHO MEAS - SV(MOD-SP4): 34 ML
BH CV ECHO MEAS - TAPSE (>1.6): 1.24 CM
BH CV ECHO MEAS - TR MAX PG: 45 MMHG
BH CV ECHO MEAS - TR MAX VEL: 334 CM/SEC
BH CV ECHO MEASUREMENTS AVERAGE E/E' RATIO: 5.84
BH CV XLRA - RV BASE: 5 CM
BH CV XLRA - RV LENGTH: 9.2 CM
BH CV XLRA - RV MID: 4.6 CM
BH CV XLRA - TDI S': 9.3 CM/SEC
BILIRUB SERPL-MCNC: 1.1 MG/DL (ref 0–1.2)
BILIRUB UR QL STRIP: NEGATIVE
BUN SERPL-MCNC: 105 MG/DL (ref 8–23)
BUN SERPL-MCNC: 112 MG/DL (ref 8–23)
BUN SERPL-MCNC: 63 MG/DL (ref 8–23)
BUN SERPL-MCNC: 70 MG/DL (ref 8–23)
BUN SERPL-MCNC: 73 MG/DL (ref 8–23)
BUN SERPL-MCNC: 78 MG/DL (ref 8–23)
BUN SERPL-MCNC: 92 MG/DL (ref 8–23)
BUN/CREAT SERPL: 29.3 (ref 7–25)
BUN/CREAT SERPL: 30.4 (ref 7–25)
BUN/CREAT SERPL: 30.8 (ref 7–25)
BUN/CREAT SERPL: 30.8 (ref 7–25)
BUN/CREAT SERPL: 31.3 (ref 7–25)
BUN/CREAT SERPL: 31.7 (ref 7–25)
BUN/CREAT SERPL: 33.2 (ref 7–25)
CALCIUM SPEC-SCNC: 8.4 MG/DL (ref 8.6–10.5)
CALCIUM SPEC-SCNC: 8.5 MG/DL (ref 8.6–10.5)
CALCIUM SPEC-SCNC: 8.5 MG/DL (ref 8.6–10.5)
CALCIUM SPEC-SCNC: 8.8 MG/DL (ref 8.6–10.5)
CALCIUM SPEC-SCNC: 8.8 MG/DL (ref 8.6–10.5)
CALCIUM SPEC-SCNC: 9.1 MG/DL (ref 8.6–10.5)
CALCIUM SPEC-SCNC: 9.1 MG/DL (ref 8.6–10.5)
CHLORIDE SERPL-SCNC: 78 MMOL/L (ref 98–107)
CHLORIDE SERPL-SCNC: 79 MMOL/L (ref 98–107)
CHLORIDE SERPL-SCNC: 81 MMOL/L (ref 98–107)
CHLORIDE SERPL-SCNC: 82 MMOL/L (ref 98–107)
CHLORIDE SERPL-SCNC: 83 MMOL/L (ref 98–107)
CLARITY UR: CLEAR
CO2 SERPL-SCNC: 23 MMOL/L (ref 22–29)
CO2 SERPL-SCNC: 23 MMOL/L (ref 22–29)
CO2 SERPL-SCNC: 24 MMOL/L (ref 22–29)
CO2 SERPL-SCNC: 28 MMOL/L (ref 22–29)
CO2 SERPL-SCNC: 29 MMOL/L (ref 22–29)
COLOR UR: YELLOW
CREAT SERPL-MCNC: 2.15 MG/DL (ref 0.76–1.27)
CREAT SERPL-MCNC: 2.27 MG/DL (ref 0.76–1.27)
CREAT SERPL-MCNC: 2.4 MG/DL (ref 0.76–1.27)
CREAT SERPL-MCNC: 2.46 MG/DL (ref 0.76–1.27)
CREAT SERPL-MCNC: 2.94 MG/DL (ref 0.76–1.27)
CREAT SERPL-MCNC: 3.16 MG/DL (ref 0.76–1.27)
CREAT SERPL-MCNC: 3.64 MG/DL (ref 0.76–1.27)
CREAT UR-MCNC: 112.8 MG/DL
D-LACTATE SERPL-SCNC: 3.9 MMOL/L (ref 0.5–2)
D-LACTATE SERPL-SCNC: 4 MMOL/L (ref 0.5–2)
D-LACTATE SERPL-SCNC: 4.3 MMOL/L (ref 0.5–2)
D-LACTATE SERPL-SCNC: 4.7 MMOL/L (ref 0.5–2)
D-LACTATE SERPL-SCNC: 5 MMOL/L (ref 0.5–2)
D-LACTATE SERPL-SCNC: 5.4 MMOL/L (ref 0.5–2)
D-LACTATE SERPL-SCNC: 5.8 MMOL/L (ref 0.5–2)
D-LACTATE SERPL-SCNC: 6 MMOL/L (ref 0.5–2)
DEPRECATED RDW RBC AUTO: 45.1 FL (ref 37–54)
DEPRECATED RDW RBC AUTO: 46.9 FL (ref 37–54)
DEPRECATED RDW RBC AUTO: 46.9 FL (ref 37–54)
EGFRCR SERPLBLD CKD-EPI 2021: 16 ML/MIN/1.73
EGFRCR SERPLBLD CKD-EPI 2021: 19 ML/MIN/1.73
EGFRCR SERPLBLD CKD-EPI 2021: 20.7 ML/MIN/1.73
EGFRCR SERPLBLD CKD-EPI 2021: 25.7 ML/MIN/1.73
EGFRCR SERPLBLD CKD-EPI 2021: 26.4 ML/MIN/1.73
EGFRCR SERPLBLD CKD-EPI 2021: 28.3 ML/MIN/1.73
EGFRCR SERPLBLD CKD-EPI 2021: 30.2 ML/MIN/1.73
EOSINOPHIL # BLD AUTO: 0 10*3/MM3 (ref 0–0.4)
EOSINOPHIL # BLD AUTO: 0 10*3/MM3 (ref 0–0.4)
EOSINOPHIL # BLD AUTO: 0.01 10*3/MM3 (ref 0–0.4)
EOSINOPHIL NFR BLD AUTO: 0 % (ref 0.3–6.2)
EOSINOPHIL NFR BLD AUTO: 0 % (ref 0.3–6.2)
EOSINOPHIL NFR BLD AUTO: 0.3 % (ref 0.3–6.2)
ERYTHROCYTE [DISTWIDTH] IN BLOOD BY AUTOMATED COUNT: 15.9 % (ref 12.3–15.4)
ERYTHROCYTE [DISTWIDTH] IN BLOOD BY AUTOMATED COUNT: 16 % (ref 12.3–15.4)
ERYTHROCYTE [DISTWIDTH] IN BLOOD BY AUTOMATED COUNT: 16.3 % (ref 12.3–15.4)
GEN 5 2HR TROPONIN T REFLEX: 64 NG/L
GLOBULIN UR ELPH-MCNC: 3.9 GM/DL
GLUCOSE BLDC GLUCOMTR-MCNC: 136 MG/DL (ref 70–130)
GLUCOSE BLDC GLUCOMTR-MCNC: 173 MG/DL (ref 70–130)
GLUCOSE BLDC GLUCOMTR-MCNC: 176 MG/DL (ref 70–130)
GLUCOSE BLDC GLUCOMTR-MCNC: 178 MG/DL (ref 70–130)
GLUCOSE BLDC GLUCOMTR-MCNC: 222 MG/DL (ref 70–130)
GLUCOSE BLDC GLUCOMTR-MCNC: 296 MG/DL (ref 70–130)
GLUCOSE SERPL-MCNC: 145 MG/DL (ref 65–99)
GLUCOSE SERPL-MCNC: 163 MG/DL (ref 65–99)
GLUCOSE SERPL-MCNC: 174 MG/DL (ref 65–99)
GLUCOSE SERPL-MCNC: 239 MG/DL (ref 65–99)
GLUCOSE SERPL-MCNC: 250 MG/DL (ref 65–99)
GLUCOSE SERPL-MCNC: 269 MG/DL (ref 65–99)
GLUCOSE SERPL-MCNC: 290 MG/DL (ref 65–99)
GLUCOSE UR STRIP-MCNC: NEGATIVE MG/DL
HBA1C MFR BLD: 9.8 % (ref 4.8–5.6)
HCT VFR BLD AUTO: 37.1 % (ref 37.5–51)
HCT VFR BLD AUTO: 38.7 % (ref 37.5–51)
HCT VFR BLD AUTO: 39 % (ref 37.5–51)
HGB BLD-MCNC: 11.8 G/DL (ref 13–17.7)
HGB BLD-MCNC: 12 G/DL (ref 13–17.7)
HGB BLD-MCNC: 12.4 G/DL (ref 13–17.7)
HGB UR QL STRIP.AUTO: NEGATIVE
HYALINE CASTS UR QL AUTO: NORMAL /LPF
IMM GRANULOCYTES # BLD AUTO: 0.01 10*3/MM3 (ref 0–0.05)
IMM GRANULOCYTES NFR BLD AUTO: 0.2 % (ref 0–0.5)
IMM GRANULOCYTES NFR BLD AUTO: 0.3 % (ref 0–0.5)
IMM GRANULOCYTES NFR BLD AUTO: 0.3 % (ref 0–0.5)
INR PPP: 5.47 (ref 0.89–1.12)
INR PPP: 6.27 (ref 0.89–1.12)
INR PPP: 7.81 (ref 0.89–1.12)
KETONES UR QL STRIP: ABNORMAL
LEFT ATRIUM VOLUME INDEX: 114.2 ML/M2
LEUKOCYTE ESTERASE UR QL STRIP.AUTO: NEGATIVE
LYMPHOCYTES # BLD AUTO: 0.32 10*3/MM3 (ref 0.7–3.1)
LYMPHOCYTES # BLD AUTO: 0.4 10*3/MM3 (ref 0.7–3.1)
LYMPHOCYTES # BLD AUTO: 0.49 10*3/MM3 (ref 0.7–3.1)
LYMPHOCYTES NFR BLD AUTO: 10.3 % (ref 19.6–45.3)
LYMPHOCYTES NFR BLD AUTO: 13.7 % (ref 19.6–45.3)
LYMPHOCYTES NFR BLD AUTO: 6.7 % (ref 19.6–45.3)
MAGNESIUM SERPL-MCNC: 2.9 MG/DL (ref 1.6–2.4)
MCH RBC QN AUTO: 24.8 PG (ref 26.6–33)
MCH RBC QN AUTO: 25.2 PG (ref 26.6–33)
MCH RBC QN AUTO: 25.3 PG (ref 26.6–33)
MCHC RBC AUTO-ENTMCNC: 31 G/DL (ref 31.5–35.7)
MCHC RBC AUTO-ENTMCNC: 31.8 G/DL (ref 31.5–35.7)
MCHC RBC AUTO-ENTMCNC: 31.8 G/DL (ref 31.5–35.7)
MCV RBC AUTO: 77.9 FL (ref 79–97)
MCV RBC AUTO: 79.4 FL (ref 79–97)
MCV RBC AUTO: 81.3 FL (ref 79–97)
MONOCYTES # BLD AUTO: 0.17 10*3/MM3 (ref 0.1–0.9)
MONOCYTES # BLD AUTO: 0.44 10*3/MM3 (ref 0.1–0.9)
MONOCYTES # BLD AUTO: 0.48 10*3/MM3 (ref 0.1–0.9)
MONOCYTES NFR BLD AUTO: 10.1 % (ref 5–12)
MONOCYTES NFR BLD AUTO: 11.3 % (ref 5–12)
MONOCYTES NFR BLD AUTO: 4.7 % (ref 5–12)
NEUTROPHILS NFR BLD AUTO: 2.89 10*3/MM3 (ref 1.7–7)
NEUTROPHILS NFR BLD AUTO: 3.05 10*3/MM3 (ref 1.7–7)
NEUTROPHILS NFR BLD AUTO: 3.95 10*3/MM3 (ref 1.7–7)
NEUTROPHILS NFR BLD AUTO: 78.1 % (ref 42.7–76)
NEUTROPHILS NFR BLD AUTO: 80.7 % (ref 42.7–76)
NEUTROPHILS NFR BLD AUTO: 83 % (ref 42.7–76)
NITRITE UR QL STRIP: NEGATIVE
NRBC BLD AUTO-RTO: 0.6 /100 WBC (ref 0–0.2)
NRBC BLD AUTO-RTO: 1 /100 WBC (ref 0–0.2)
NRBC BLD AUTO-RTO: 1.5 /100 WBC (ref 0–0.2)
NT-PROBNP SERPL-MCNC: ABNORMAL PG/ML (ref 0–1800)
OSMOLALITY SERPL: 305 MOSM/KG (ref 275–295)
OSMOLALITY UR: 320 MOSM/KG (ref 300–1100)
PH UR STRIP.AUTO: <=5 [PH] (ref 5–8)
PHOSPHATE SERPL-MCNC: 4.5 MG/DL (ref 2.5–4.5)
PLATELET # BLD AUTO: 198 10*3/MM3 (ref 140–450)
PLATELET # BLD AUTO: 201 10*3/MM3 (ref 140–450)
PLATELET # BLD AUTO: 212 10*3/MM3 (ref 140–450)
PMV BLD AUTO: 11.6 FL (ref 6–12)
PMV BLD AUTO: 11.8 FL (ref 6–12)
PMV BLD AUTO: 11.9 FL (ref 6–12)
POTASSIUM SERPL-SCNC: 3.5 MMOL/L (ref 3.5–5.2)
POTASSIUM SERPL-SCNC: 3.9 MMOL/L (ref 3.5–5.2)
POTASSIUM SERPL-SCNC: 4.1 MMOL/L (ref 3.5–5.2)
POTASSIUM SERPL-SCNC: 4.2 MMOL/L (ref 3.5–5.2)
POTASSIUM SERPL-SCNC: 4.6 MMOL/L (ref 3.5–5.2)
POTASSIUM SERPL-SCNC: 4.8 MMOL/L (ref 3.5–5.2)
POTASSIUM SERPL-SCNC: 5.3 MMOL/L (ref 3.5–5.2)
PROT SERPL-MCNC: 7.3 G/DL (ref 6–8.5)
PROT UR QL STRIP: ABNORMAL
PROTHROMBIN TIME: 50.1 SECONDS (ref 12.2–14.5)
PROTHROMBIN TIME: 55.8 SECONDS (ref 12.2–14.5)
PROTHROMBIN TIME: 66.3 SECONDS (ref 12.2–14.5)
QT INTERVAL: 476 MS
QT INTERVAL: 482 MS
QTC INTERVAL: 608 MS
QTC INTERVAL: 632 MS
RBC # BLD AUTO: 4.76 10*6/MM3 (ref 4.14–5.8)
RBC # BLD AUTO: 4.76 10*6/MM3 (ref 4.14–5.8)
RBC # BLD AUTO: 4.91 10*6/MM3 (ref 4.14–5.8)
RBC # UR STRIP: NORMAL /HPF
REF LAB TEST METHOD: NORMAL
SODIUM SERPL-SCNC: 121 MMOL/L (ref 136–145)
SODIUM SERPL-SCNC: 122 MMOL/L (ref 136–145)
SODIUM SERPL-SCNC: 122 MMOL/L (ref 136–145)
SODIUM SERPL-SCNC: 124 MMOL/L (ref 136–145)
SODIUM SERPL-SCNC: 125 MMOL/L (ref 136–145)
SODIUM SERPL-SCNC: 126 MMOL/L (ref 136–145)
SODIUM SERPL-SCNC: 127 MMOL/L (ref 136–145)
SODIUM SERPL-SCNC: 127 MMOL/L (ref 136–145)
SODIUM UR-SCNC: <20 MMOL/L
SP GR UR STRIP: 1.02 (ref 1–1.03)
SQUAMOUS #/AREA URNS HPF: NORMAL /HPF
T4 FREE SERPL-MCNC: 1.13 NG/DL (ref 0.93–1.7)
TROPONIN T DELTA: -3 NG/L
TROPONIN T SERPL HS-MCNC: 67 NG/L
TSH SERPL DL<=0.05 MIU/L-ACNC: 3.15 UIU/ML (ref 0.27–4.2)
UROBILINOGEN UR QL STRIP: ABNORMAL
WBC # UR STRIP: NORMAL /HPF
WBC NRBC COR # BLD AUTO: 3.58 10*3/MM3 (ref 3.4–10.8)
WBC NRBC COR # BLD AUTO: 3.9 10*3/MM3 (ref 3.4–10.8)
WBC NRBC COR # BLD AUTO: 4.76 10*3/MM3 (ref 3.4–10.8)

## 2023-01-01 PROCEDURE — 93306 TTE W/DOPPLER COMPLETE: CPT | Performed by: INTERNAL MEDICINE

## 2023-01-01 PROCEDURE — 94761 N-INVAS EAR/PLS OXIMETRY MLT: CPT

## 2023-01-01 PROCEDURE — 85730 THROMBOPLASTIN TIME PARTIAL: CPT | Performed by: NURSE PRACTITIONER

## 2023-01-01 PROCEDURE — 99222 1ST HOSP IP/OBS MODERATE 55: CPT | Performed by: PHYSICIAN ASSISTANT

## 2023-01-01 PROCEDURE — 25010000002 PIPERACILLIN SOD-TAZOBACTAM PER 1 G: Performed by: INTERNAL MEDICINE

## 2023-01-01 PROCEDURE — 94799 UNLISTED PULMONARY SVC/PX: CPT

## 2023-01-01 PROCEDURE — 93010 ELECTROCARDIOGRAM REPORT: CPT | Performed by: INTERNAL MEDICINE

## 2023-01-01 PROCEDURE — 25010000002 ONDANSETRON PER 1 MG: Performed by: STUDENT IN AN ORGANIZED HEALTH CARE EDUCATION/TRAINING PROGRAM

## 2023-01-01 PROCEDURE — 25010000002 HYDROMORPHONE PER 4 MG: Performed by: PHYSICAL MEDICINE & REHABILITATION

## 2023-01-01 PROCEDURE — 93005 ELECTROCARDIOGRAM TRACING: CPT | Performed by: STUDENT IN AN ORGANIZED HEALTH CARE EDUCATION/TRAINING PROGRAM

## 2023-01-01 PROCEDURE — 63710000001 INSULIN LISPRO (HUMAN) PER 5 UNITS: Performed by: NURSE PRACTITIONER

## 2023-01-01 PROCEDURE — 99222 1ST HOSP IP/OBS MODERATE 55: CPT | Performed by: STUDENT IN AN ORGANIZED HEALTH CARE EDUCATION/TRAINING PROGRAM

## 2023-01-01 PROCEDURE — 25810000003 SODIUM CHLORIDE 0.9 % SOLUTION: Performed by: STUDENT IN AN ORGANIZED HEALTH CARE EDUCATION/TRAINING PROGRAM

## 2023-01-01 PROCEDURE — 99232 SBSQ HOSP IP/OBS MODERATE 35: CPT | Performed by: STUDENT IN AN ORGANIZED HEALTH CARE EDUCATION/TRAINING PROGRAM

## 2023-01-01 PROCEDURE — 71045 X-RAY EXAM CHEST 1 VIEW: CPT

## 2023-01-01 PROCEDURE — 84295 ASSAY OF SERUM SODIUM: CPT | Performed by: INTERNAL MEDICINE

## 2023-01-01 PROCEDURE — 94660 CPAP INITIATION&MGMT: CPT

## 2023-01-01 PROCEDURE — 25010000002 MIDAZOLAM PER 1 MG: Performed by: PHYSICAL MEDICINE & REHABILITATION

## 2023-01-01 PROCEDURE — 93005 ELECTROCARDIOGRAM TRACING: CPT | Performed by: PHYSICIAN ASSISTANT

## 2023-01-01 PROCEDURE — 80048 BASIC METABOLIC PNL TOTAL CA: CPT | Performed by: STUDENT IN AN ORGANIZED HEALTH CARE EDUCATION/TRAINING PROGRAM

## 2023-01-01 PROCEDURE — 81001 URINALYSIS AUTO W/SCOPE: CPT | Performed by: NURSE PRACTITIONER

## 2023-01-01 PROCEDURE — 85025 COMPLETE CBC W/AUTO DIFF WBC: CPT | Performed by: STUDENT IN AN ORGANIZED HEALTH CARE EDUCATION/TRAINING PROGRAM

## 2023-01-01 PROCEDURE — 99233 SBSQ HOSP IP/OBS HIGH 50: CPT | Performed by: STUDENT IN AN ORGANIZED HEALTH CARE EDUCATION/TRAINING PROGRAM

## 2023-01-01 PROCEDURE — 85025 COMPLETE CBC W/AUTO DIFF WBC: CPT | Performed by: INTERNAL MEDICINE

## 2023-01-01 PROCEDURE — 83880 ASSAY OF NATRIURETIC PEPTIDE: CPT | Performed by: NURSE PRACTITIONER

## 2023-01-01 PROCEDURE — 83605 ASSAY OF LACTIC ACID: CPT | Performed by: STUDENT IN AN ORGANIZED HEALTH CARE EDUCATION/TRAINING PROGRAM

## 2023-01-01 PROCEDURE — 84484 ASSAY OF TROPONIN QUANT: CPT | Performed by: NURSE PRACTITIONER

## 2023-01-01 PROCEDURE — 82948 REAGENT STRIP/BLOOD GLUCOSE: CPT

## 2023-01-01 PROCEDURE — 25010000002 SULFUR HEXAFLUORIDE MICROSPH 60.7-25 MG RECONSTITUTED SUSPENSION: Performed by: NURSE PRACTITIONER

## 2023-01-01 PROCEDURE — 82570 ASSAY OF URINE CREATININE: CPT | Performed by: NURSE PRACTITIONER

## 2023-01-01 PROCEDURE — 25810000003 SODIUM CHLORIDE 0.9 % SOLUTION: Performed by: INTERNAL MEDICINE

## 2023-01-01 PROCEDURE — 84443 ASSAY THYROID STIM HORMONE: CPT | Performed by: NURSE PRACTITIONER

## 2023-01-01 PROCEDURE — 5A09357 ASSISTANCE WITH RESPIRATORY VENTILATION, LESS THAN 24 CONSECUTIVE HOURS, CONTINUOUS POSITIVE AIRWAY PRESSURE: ICD-10-PCS | Performed by: STUDENT IN AN ORGANIZED HEALTH CARE EDUCATION/TRAINING PROGRAM

## 2023-01-01 PROCEDURE — 25010000002 METHYLPREDNISOLONE PER 125 MG: Performed by: INTERNAL MEDICINE

## 2023-01-01 PROCEDURE — 84100 ASSAY OF PHOSPHORUS: CPT | Performed by: NURSE PRACTITIONER

## 2023-01-01 PROCEDURE — 85025 COMPLETE CBC W/AUTO DIFF WBC: CPT | Performed by: NURSE PRACTITIONER

## 2023-01-01 PROCEDURE — 93005 ELECTROCARDIOGRAM TRACING: CPT | Performed by: NURSE PRACTITIONER

## 2023-01-01 PROCEDURE — 80048 BASIC METABOLIC PNL TOTAL CA: CPT | Performed by: INTERNAL MEDICINE

## 2023-01-01 PROCEDURE — 83605 ASSAY OF LACTIC ACID: CPT | Performed by: NURSE PRACTITIONER

## 2023-01-01 PROCEDURE — 83930 ASSAY OF BLOOD OSMOLALITY: CPT | Performed by: NURSE PRACTITIONER

## 2023-01-01 PROCEDURE — 84300 ASSAY OF URINE SODIUM: CPT | Performed by: NURSE PRACTITIONER

## 2023-01-01 PROCEDURE — 83036 HEMOGLOBIN GLYCOSYLATED A1C: CPT | Performed by: NURSE PRACTITIONER

## 2023-01-01 PROCEDURE — 93306 TTE W/DOPPLER COMPLETE: CPT

## 2023-01-01 PROCEDURE — 83735 ASSAY OF MAGNESIUM: CPT | Performed by: NURSE PRACTITIONER

## 2023-01-01 PROCEDURE — 80053 COMPREHEN METABOLIC PANEL: CPT | Performed by: NURSE PRACTITIONER

## 2023-01-01 PROCEDURE — 85610 PROTHROMBIN TIME: CPT | Performed by: NURSE PRACTITIONER

## 2023-01-01 PROCEDURE — 85610 PROTHROMBIN TIME: CPT | Performed by: PHYSICIAN ASSISTANT

## 2023-01-01 PROCEDURE — 85610 PROTHROMBIN TIME: CPT

## 2023-01-01 PROCEDURE — 84439 ASSAY OF FREE THYROXINE: CPT | Performed by: NURSE PRACTITIONER

## 2023-01-01 PROCEDURE — 87040 BLOOD CULTURE FOR BACTERIA: CPT | Performed by: INTERNAL MEDICINE

## 2023-01-01 PROCEDURE — 76775 US EXAM ABDO BACK WALL LIM: CPT

## 2023-01-01 PROCEDURE — 25010000002 VANCOMYCIN 10 G RECONSTITUTED SOLUTION: Performed by: INTERNAL MEDICINE

## 2023-01-01 PROCEDURE — 83935 ASSAY OF URINE OSMOLALITY: CPT | Performed by: NURSE PRACTITIONER

## 2023-01-01 RX ORDER — CARVEDILOL 6.25 MG/1
6.25 TABLET ORAL DAILY
Status: DISCONTINUED | OUTPATIENT
Start: 2023-01-01 | End: 2023-01-01

## 2023-01-01 RX ORDER — ONDANSETRON 2 MG/ML
4 INJECTION INTRAMUSCULAR; INTRAVENOUS EVERY 6 HOURS PRN
Status: DISCONTINUED | OUTPATIENT
Start: 2023-01-01 | End: 2023-12-22 | Stop reason: HOSPADM

## 2023-01-01 RX ORDER — IBUPROFEN 600 MG/1
1 TABLET ORAL
Status: DISCONTINUED | OUTPATIENT
Start: 2023-01-01 | End: 2023-01-01

## 2023-01-01 RX ORDER — POLYETHYLENE GLYCOL 3350 17 G/17G
17 POWDER, FOR SOLUTION ORAL DAILY PRN
Status: DISCONTINUED | OUTPATIENT
Start: 2023-01-01 | End: 2023-12-22 | Stop reason: HOSPADM

## 2023-01-01 RX ORDER — BUMETANIDE 0.25 MG/ML
2 INJECTION INTRAMUSCULAR; INTRAVENOUS ONCE
Status: COMPLETED | OUTPATIENT
Start: 2023-01-01 | End: 2023-01-01

## 2023-01-01 RX ORDER — LEVOTHYROXINE SODIUM 0.03 MG/1
25 TABLET ORAL
Status: DISCONTINUED | OUTPATIENT
Start: 2023-01-01 | End: 2023-12-22 | Stop reason: HOSPADM

## 2023-01-01 RX ORDER — AMIODARONE HYDROCHLORIDE 200 MG/1
400 TABLET ORAL 3 TIMES DAILY
Status: DISCONTINUED | OUTPATIENT
Start: 2023-01-01 | End: 2023-12-22 | Stop reason: HOSPADM

## 2023-01-01 RX ORDER — AMIODARONE HYDROCHLORIDE 200 MG/1
200 TABLET ORAL
Status: DISCONTINUED | OUTPATIENT
Start: 2023-12-27 | End: 2023-01-01

## 2023-01-01 RX ORDER — NITROGLYCERIN 0.4 MG/1
0.4 TABLET SUBLINGUAL
COMMUNITY

## 2023-01-01 RX ORDER — AMIODARONE HYDROCHLORIDE 200 MG/1
200 TABLET ORAL
Status: DISCONTINUED | OUTPATIENT
Start: 2024-01-26 | End: 2023-12-22 | Stop reason: HOSPADM

## 2023-01-01 RX ORDER — BISACODYL 5 MG/1
5 TABLET, DELAYED RELEASE ORAL DAILY PRN
Status: DISCONTINUED | OUTPATIENT
Start: 2023-01-01 | End: 2023-12-22 | Stop reason: HOSPADM

## 2023-01-01 RX ORDER — AMOXICILLIN 250 MG
2 CAPSULE ORAL 2 TIMES DAILY
Status: DISCONTINUED | OUTPATIENT
Start: 2023-01-01 | End: 2023-12-22 | Stop reason: HOSPADM

## 2023-01-01 RX ORDER — DEXTROSE MONOHYDRATE 25 G/50ML
25 INJECTION, SOLUTION INTRAVENOUS
Status: DISCONTINUED | OUTPATIENT
Start: 2023-01-01 | End: 2023-01-01

## 2023-01-01 RX ORDER — ASPIRIN 81 MG/1
81 TABLET, CHEWABLE ORAL DAILY
Status: DISCONTINUED | OUTPATIENT
Start: 2023-01-01 | End: 2023-12-22 | Stop reason: HOSPADM

## 2023-01-01 RX ORDER — SODIUM CHLORIDE 9 MG/ML
40 INJECTION, SOLUTION INTRAVENOUS AS NEEDED
Status: DISCONTINUED | OUTPATIENT
Start: 2023-01-01 | End: 2023-12-22 | Stop reason: HOSPADM

## 2023-01-01 RX ORDER — TORSEMIDE 100 MG/1
100 TABLET ORAL DAILY
Status: DISCONTINUED | OUTPATIENT
Start: 2023-01-01 | End: 2023-01-01

## 2023-01-01 RX ORDER — GABAPENTIN 300 MG/1
300 CAPSULE ORAL EVERY 8 HOURS SCHEDULED
Status: DISCONTINUED | OUTPATIENT
Start: 2023-01-01 | End: 2023-12-22 | Stop reason: HOSPADM

## 2023-01-01 RX ORDER — HYDROMORPHONE HYDROCHLORIDE 1 MG/ML
0.5 INJECTION, SOLUTION INTRAMUSCULAR; INTRAVENOUS; SUBCUTANEOUS ONCE
Status: DISCONTINUED | OUTPATIENT
Start: 2023-01-01 | End: 2023-12-22 | Stop reason: HOSPADM

## 2023-01-01 RX ORDER — SODIUM CHLORIDE 9 MG/ML
100 INJECTION, SOLUTION INTRAVENOUS CONTINUOUS
Status: ACTIVE | OUTPATIENT
Start: 2023-01-01 | End: 2023-01-01

## 2023-01-01 RX ORDER — ACETAMINOPHEN 650 MG/1
650 SUPPOSITORY RECTAL EVERY 4 HOURS PRN
Status: DISCONTINUED | OUTPATIENT
Start: 2023-01-01 | End: 2023-12-22 | Stop reason: HOSPADM

## 2023-01-01 RX ORDER — ACETAMINOPHEN 160 MG/5ML
650 SOLUTION ORAL EVERY 4 HOURS PRN
Status: DISCONTINUED | OUTPATIENT
Start: 2023-01-01 | End: 2023-12-22 | Stop reason: HOSPADM

## 2023-01-01 RX ORDER — BISACODYL 10 MG
10 SUPPOSITORY, RECTAL RECTAL DAILY PRN
Status: DISCONTINUED | OUTPATIENT
Start: 2023-01-01 | End: 2023-12-22 | Stop reason: HOSPADM

## 2023-01-01 RX ORDER — CHOLECALCIFEROL (VITAMIN D3) 125 MCG
5 CAPSULE ORAL NIGHTLY PRN
Status: DISCONTINUED | OUTPATIENT
Start: 2023-01-01 | End: 2023-12-22 | Stop reason: HOSPADM

## 2023-01-01 RX ORDER — ATORVASTATIN CALCIUM 40 MG/1
40 TABLET, FILM COATED ORAL DAILY
Status: DISCONTINUED | OUTPATIENT
Start: 2023-01-01 | End: 2023-01-01

## 2023-01-01 RX ORDER — TORSEMIDE 100 MG/1
100 TABLET ORAL DAILY
COMMUNITY

## 2023-01-01 RX ORDER — AMIODARONE HYDROCHLORIDE 200 MG/1
400 TABLET ORAL
Status: DISCONTINUED | OUTPATIENT
Start: 2023-12-27 | End: 2023-12-22 | Stop reason: HOSPADM

## 2023-01-01 RX ORDER — ONDANSETRON 2 MG/ML
4 INJECTION INTRAMUSCULAR; INTRAVENOUS ONCE
Status: DISCONTINUED | OUTPATIENT
Start: 2023-01-01 | End: 2023-12-22 | Stop reason: HOSPADM

## 2023-01-01 RX ORDER — HYDROMORPHONE HYDROCHLORIDE 1 MG/ML
0.5 INJECTION, SOLUTION INTRAMUSCULAR; INTRAVENOUS; SUBCUTANEOUS
Status: DISCONTINUED | OUTPATIENT
Start: 2023-01-01 | End: 2023-01-01

## 2023-01-01 RX ORDER — FAMOTIDINE 20 MG/1
20 TABLET, FILM COATED ORAL
Status: DISCONTINUED | OUTPATIENT
Start: 2023-01-01 | End: 2023-12-22 | Stop reason: HOSPADM

## 2023-01-01 RX ORDER — METHYLPREDNISOLONE SODIUM SUCCINATE 125 MG/2ML
80 INJECTION, POWDER, LYOPHILIZED, FOR SOLUTION INTRAMUSCULAR; INTRAVENOUS EVERY 8 HOURS
Status: DISCONTINUED | OUTPATIENT
Start: 2023-01-01 | End: 2023-12-22 | Stop reason: HOSPADM

## 2023-01-01 RX ORDER — SODIUM CHLORIDE 0.9 % (FLUSH) 0.9 %
10 SYRINGE (ML) INJECTION EVERY 12 HOURS SCHEDULED
Status: DISCONTINUED | OUTPATIENT
Start: 2023-01-01 | End: 2023-12-22 | Stop reason: HOSPADM

## 2023-01-01 RX ORDER — MELATONIN
1000 DAILY
Status: DISCONTINUED | OUTPATIENT
Start: 2023-01-01 | End: 2023-01-01

## 2023-01-01 RX ORDER — DULOXETIN HYDROCHLORIDE 60 MG/1
60 CAPSULE, DELAYED RELEASE ORAL DAILY
Status: DISCONTINUED | OUTPATIENT
Start: 2023-01-01 | End: 2023-12-22 | Stop reason: HOSPADM

## 2023-01-01 RX ORDER — INSULIN LISPRO 100 [IU]/ML
2-7 INJECTION, SOLUTION INTRAVENOUS; SUBCUTANEOUS
Status: DISCONTINUED | OUTPATIENT
Start: 2023-01-01 | End: 2023-01-01

## 2023-01-01 RX ORDER — NICOTINE POLACRILEX 4 MG
15 LOZENGE BUCCAL
Status: DISCONTINUED | OUTPATIENT
Start: 2023-01-01 | End: 2023-01-01

## 2023-01-01 RX ORDER — AMIODARONE HYDROCHLORIDE 200 MG/1
400 TABLET ORAL 3 TIMES DAILY
Status: DISCONTINUED | OUTPATIENT
Start: 2023-01-01 | End: 2023-01-01

## 2023-01-01 RX ORDER — SPIRONOLACTONE 25 MG/1
100 TABLET ORAL DAILY
Status: DISCONTINUED | OUTPATIENT
Start: 2023-01-01 | End: 2023-01-01

## 2023-01-01 RX ORDER — ACETAMINOPHEN 325 MG/1
650 TABLET ORAL EVERY 4 HOURS PRN
Status: DISCONTINUED | OUTPATIENT
Start: 2023-01-01 | End: 2023-12-22 | Stop reason: HOSPADM

## 2023-01-01 RX ORDER — MIDAZOLAM HYDROCHLORIDE 1 MG/ML
1 INJECTION INTRAMUSCULAR; INTRAVENOUS
Status: DISCONTINUED | OUTPATIENT
Start: 2023-01-01 | End: 2023-12-22 | Stop reason: HOSPADM

## 2023-01-01 RX ORDER — WARFARIN SODIUM 3 MG/1
6 TABLET ORAL
Status: DISCONTINUED | OUTPATIENT
Start: 2023-01-01 | End: 2023-01-01

## 2023-01-01 RX ORDER — FUROSEMIDE 10 MG/ML
80 INJECTION INTRAMUSCULAR; INTRAVENOUS EVERY 12 HOURS SCHEDULED
Status: DISCONTINUED | OUTPATIENT
Start: 2023-01-01 | End: 2023-01-01

## 2023-01-01 RX ORDER — SODIUM CHLORIDE 0.9 % (FLUSH) 0.9 %
10 SYRINGE (ML) INJECTION AS NEEDED
Status: DISCONTINUED | OUTPATIENT
Start: 2023-01-01 | End: 2023-12-22 | Stop reason: HOSPADM

## 2023-01-01 RX ORDER — MELATONIN
1000 DAILY
COMMUNITY

## 2023-01-01 RX ADMIN — Medication 10 ML: at 08:35

## 2023-01-01 RX ADMIN — AMIODARONE HYDROCHLORIDE 400 MG: 200 TABLET ORAL at 08:34

## 2023-01-01 RX ADMIN — Medication 10 ML: at 08:11

## 2023-01-01 RX ADMIN — Medication 10 ML: at 08:36

## 2023-01-01 RX ADMIN — AMIODARONE HYDROCHLORIDE 400 MG: 200 TABLET ORAL at 17:29

## 2023-01-01 RX ADMIN — VANCOMYCIN HYDROCHLORIDE 2250 MG: 10 INJECTION, POWDER, LYOPHILIZED, FOR SOLUTION INTRAVENOUS at 08:29

## 2023-01-01 RX ADMIN — Medication 10 ML: at 22:04

## 2023-01-01 RX ADMIN — METHYLPREDNISOLONE SODIUM SUCCINATE 80 MG: 125 INJECTION INTRAMUSCULAR; INTRAVENOUS at 12:59

## 2023-01-01 RX ADMIN — ATORVASTATIN CALCIUM 40 MG: 40 TABLET, FILM COATED ORAL at 08:35

## 2023-01-01 RX ADMIN — AMIODARONE HYDROCHLORIDE 400 MG: 200 TABLET ORAL at 21:57

## 2023-01-01 RX ADMIN — INSULIN LISPRO 4 UNITS: 100 INJECTION, SOLUTION INTRAVENOUS; SUBCUTANEOUS at 17:30

## 2023-01-01 RX ADMIN — ATORVASTATIN CALCIUM 40 MG: 40 TABLET, FILM COATED ORAL at 08:34

## 2023-01-01 RX ADMIN — METHYLPREDNISOLONE SODIUM SUCCINATE 80 MG: 125 INJECTION INTRAMUSCULAR; INTRAVENOUS at 21:56

## 2023-01-01 RX ADMIN — Medication 10 ML: at 21:10

## 2023-01-01 RX ADMIN — FAMOTIDINE 20 MG: 20 TABLET, FILM COATED ORAL at 17:59

## 2023-01-01 RX ADMIN — CARVEDILOL 6.25 MG: 6.25 TABLET, FILM COATED ORAL at 08:33

## 2023-01-01 RX ADMIN — GABAPENTIN 300 MG: 300 CAPSULE ORAL at 08:13

## 2023-01-01 RX ADMIN — GABAPENTIN 300 MG: 300 CAPSULE ORAL at 05:57

## 2023-01-01 RX ADMIN — SENNOSIDES AND DOCUSATE SODIUM 2 TABLET: 8.6; 5 TABLET ORAL at 08:09

## 2023-01-01 RX ADMIN — METHYLPREDNISOLONE SODIUM SUCCINATE 80 MG: 125 INJECTION INTRAMUSCULAR; INTRAVENOUS at 05:20

## 2023-01-01 RX ADMIN — ASPIRIN 81 MG CHEWABLE TABLET 81 MG: 81 TABLET CHEWABLE at 08:10

## 2023-01-01 RX ADMIN — INSULIN LISPRO 2 UNITS: 100 INJECTION, SOLUTION INTRAVENOUS; SUBCUTANEOUS at 17:02

## 2023-01-01 RX ADMIN — FAMOTIDINE 20 MG: 20 TABLET, FILM COATED ORAL at 08:11

## 2023-01-01 RX ADMIN — AMIODARONE HYDROCHLORIDE 400 MG: 200 TABLET ORAL at 02:52

## 2023-01-01 RX ADMIN — SPIRONOLACTONE 100 MG: 25 TABLET ORAL at 08:33

## 2023-01-01 RX ADMIN — ASPIRIN 81 MG CHEWABLE TABLET 81 MG: 81 TABLET CHEWABLE at 08:32

## 2023-01-01 RX ADMIN — ATORVASTATIN CALCIUM 40 MG: 40 TABLET, FILM COATED ORAL at 08:10

## 2023-01-01 RX ADMIN — LEVOTHYROXINE SODIUM 25 MCG: 25 TABLET ORAL at 05:01

## 2023-01-01 RX ADMIN — DULOXETINE HYDROCHLORIDE 60 MG: 60 CAPSULE, DELAYED RELEASE ORAL at 08:33

## 2023-01-01 RX ADMIN — ONDANSETRON 4 MG: 2 INJECTION INTRAMUSCULAR; INTRAVENOUS at 11:11

## 2023-01-01 RX ADMIN — Medication 1000 UNITS: at 08:34

## 2023-01-01 RX ADMIN — LEVOTHYROXINE SODIUM 25 MCG: 25 TABLET ORAL at 08:12

## 2023-01-01 RX ADMIN — ONDANSETRON 4 MG: 2 INJECTION INTRAMUSCULAR; INTRAVENOUS at 11:17

## 2023-01-01 RX ADMIN — PIPERACILLIN SODIUM AND TAZOBACTAM SODIUM 3.38 G: 3; .375 INJECTION, SOLUTION INTRAVENOUS at 08:29

## 2023-01-01 RX ADMIN — SPIRONOLACTONE 100 MG: 25 TABLET ORAL at 08:35

## 2023-01-01 RX ADMIN — MIDAZOLAM HYDROCHLORIDE 1 MG: 1 INJECTION, SOLUTION INTRAMUSCULAR; INTRAVENOUS at 13:03

## 2023-01-01 RX ADMIN — AMIODARONE HYDROCHLORIDE 400 MG: 200 TABLET ORAL at 17:00

## 2023-01-01 RX ADMIN — SENNOSIDES AND DOCUSATE SODIUM 2 TABLET: 8.6; 5 TABLET ORAL at 08:35

## 2023-01-01 RX ADMIN — METHYLPREDNISOLONE SODIUM SUCCINATE 80 MG: 125 INJECTION INTRAMUSCULAR; INTRAVENOUS at 05:01

## 2023-01-01 RX ADMIN — SODIUM CHLORIDE 100 ML/HR: 9 INJECTION, SOLUTION INTRAVENOUS at 11:41

## 2023-01-01 RX ADMIN — INSULIN LISPRO 4 UNITS: 100 INJECTION, SOLUTION INTRAVENOUS; SUBCUTANEOUS at 08:35

## 2023-01-01 RX ADMIN — METHYLPREDNISOLONE SODIUM SUCCINATE 80 MG: 125 INJECTION INTRAMUSCULAR; INTRAVENOUS at 11:11

## 2023-01-01 RX ADMIN — Medication 5 MG: at 03:27

## 2023-01-01 RX ADMIN — SULFUR HEXAFLUORIDE 5 ML: KIT at 12:01

## 2023-01-01 RX ADMIN — GABAPENTIN 300 MG: 300 CAPSULE ORAL at 13:00

## 2023-01-01 RX ADMIN — AMIODARONE HYDROCHLORIDE 400 MG: 200 TABLET ORAL at 08:10

## 2023-01-01 RX ADMIN — DULOXETINE HYDROCHLORIDE 60 MG: 60 CAPSULE, DELAYED RELEASE ORAL at 08:09

## 2023-01-01 RX ADMIN — CARVEDILOL 6.25 MG: 6.25 TABLET, FILM COATED ORAL at 08:35

## 2023-01-01 RX ADMIN — INSULIN LISPRO 2 UNITS: 100 INJECTION, SOLUTION INTRAVENOUS; SUBCUTANEOUS at 22:03

## 2023-01-01 RX ADMIN — INSULIN LISPRO 4 UNITS: 100 INJECTION, SOLUTION INTRAVENOUS; SUBCUTANEOUS at 21:56

## 2023-01-01 RX ADMIN — INSULIN LISPRO 3 UNITS: 100 INJECTION, SOLUTION INTRAVENOUS; SUBCUTANEOUS at 11:11

## 2023-01-01 RX ADMIN — Medication 1000 UNITS: at 08:11

## 2023-01-01 RX ADMIN — HYDROMORPHONE HYDROCHLORIDE 0.5 MG: 1 INJECTION, SOLUTION INTRAMUSCULAR; INTRAVENOUS; SUBCUTANEOUS at 13:03

## 2023-01-01 RX ADMIN — DULOXETINE HYDROCHLORIDE 60 MG: 60 CAPSULE, DELAYED RELEASE ORAL at 08:35

## 2023-01-01 RX ADMIN — INSULIN LISPRO 2 UNITS: 100 INJECTION, SOLUTION INTRAVENOUS; SUBCUTANEOUS at 08:34

## 2023-01-01 RX ADMIN — GABAPENTIN 300 MG: 300 CAPSULE ORAL at 21:57

## 2023-01-01 RX ADMIN — METHYLPREDNISOLONE SODIUM SUCCINATE 80 MG: 125 INJECTION INTRAMUSCULAR; INTRAVENOUS at 22:03

## 2023-01-01 RX ADMIN — ASPIRIN 81 MG CHEWABLE TABLET 81 MG: 81 TABLET CHEWABLE at 08:35

## 2023-01-01 RX ADMIN — GABAPENTIN 300 MG: 300 CAPSULE ORAL at 21:55

## 2023-01-01 RX ADMIN — BUMETANIDE 2 MG: 0.25 INJECTION, SOLUTION INTRAMUSCULAR; INTRAVENOUS at 17:29

## 2023-01-01 RX ADMIN — GABAPENTIN 300 MG: 300 CAPSULE ORAL at 13:03

## 2023-01-01 RX ADMIN — SENNOSIDES AND DOCUSATE SODIUM 2 TABLET: 8.6; 5 TABLET ORAL at 21:57

## 2023-01-01 RX ADMIN — INSULIN LISPRO 4 UNITS: 100 INJECTION, SOLUTION INTRAVENOUS; SUBCUTANEOUS at 12:58

## 2023-01-01 RX ADMIN — METHYLPREDNISOLONE SODIUM SUCCINATE 80 MG: 125 INJECTION INTRAMUSCULAR; INTRAVENOUS at 11:25

## 2023-01-01 RX ADMIN — SENNOSIDES AND DOCUSATE SODIUM 2 TABLET: 8.6; 5 TABLET ORAL at 08:32

## 2023-01-01 RX ADMIN — GABAPENTIN 300 MG: 300 CAPSULE ORAL at 17:29

## 2023-01-01 RX ADMIN — Medication 1000 UNITS: at 08:35

## 2023-01-01 RX ADMIN — HYDROMORPHONE HYDROCHLORIDE 0.5 MG: 1 INJECTION, SOLUTION INTRAMUSCULAR; INTRAVENOUS; SUBCUTANEOUS at 11:17

## 2023-03-12 PROCEDURE — 93296 REM INTERROG EVL PM/IDS: CPT | Performed by: STUDENT IN AN ORGANIZED HEALTH CARE EDUCATION/TRAINING PROGRAM

## 2023-03-12 PROCEDURE — 93295 DEV INTERROG REMOTE 1/2/MLT: CPT | Performed by: STUDENT IN AN ORGANIZED HEALTH CARE EDUCATION/TRAINING PROGRAM

## 2023-03-20 NOTE — PROGRESS NOTES
Cardiac Electrophysiology Outpatient Note  [facility]    Office Visit     Omero Holland  [id number]  03/23/2023    Primary Care Physician: Dimitrios Morales III, MD    Referred By: No ref. provider found    Subjective     No chief complaint on file.    Problem List:  1. Mixed CM        A)CM dating back to 2007 EF 30% by [facility]       B)[facility] ostial CX BMS 2016        C)MPS 2/17 EF 25% Inferior ischemia        D)[facility] LAD BMS 3/17        E)Echocardiogram EF 35% 5/17        F)Echocardiogram EF 25% 6/16/17        G)MUGA  7/25/17 EF 35%        H)Chronic class III SHF with admission to [facility] in setting of PAF with RVR, invitation of Amiodarone and diuresis.  Cozaar stopped secondary to Hypotension and kidney issues       I ) s/p BIV ICD [employer] 2017       J) Echo 07/14/2021: EF 35% Left ventricular diastolic function is consistent with (grade Ia w/high LAP) impaired relaxation. Moderate to severe TR. RVSP > 55. LV mild to moderately dilated. RV mild to moderate dilation, reduced function. Moderate MR.      K ) 12/2021: Hospitalization for fluid overload, was up 20 lbs.  2. Hx of remote Aflutter RFA 2013   3. Frequent PVC's/LBBB   4. HTN   5. HLD   6. LOIS/CPAP   7. BPH/Elevated PSA followed by Dr. Flynn   8. Hypothyroidism   9. Atrial fibrillation              A. Formerly on amiodarone              B. CHADSVASC = 4, on coumadin              C. AV node ablation, BiV ICD implant 2017  10. Vertigo              A. Fell early February 2022, one night hospital stay at [facility]  11. Diabetes Mellitus    History of Present Illness:   Omero Holland is a 82 y.o. male who presents to my electrophysiology clinic for follow up of [employer] BiV ICD.  He has had known chronic increase in his RV pacing threshold.  He also has a mildly reduced LV pacing percentage.  He is overall done okay since a cardiac standpoint since last time we saw him.  He did have a broken  back which is been bothersome to him.  He has stable dyspnea on exertion which is unchanged.  Blood pressures been okay.  No problems with his pacemaker site.    Past Medical History:   Diagnosis Date   • A-fib (HCC)    • Arthritis    • Cardiomyopathy (HCC)     Non-ischemic   • Coronary artery disease     2 stents   • Diabetes mellitus (HCC)     dx 3- 4 years ago- checks fsbs daily   • Dizziness    • Dyslipidemia    • Hyperlipidemia    • Hypertension     H/o   • Kidney disease     HX- sees a nephrologist   • Melanoma (HCC)    • Obesity    • LOIS (obstructive sleep apnea)     cpap   • PAF (paroxysmal atrial fibrillation) (HCC) 8/21/2017    /atrial flutter   • Palpitations     Tachy Palpitations   • Psoriatic arthritis (HCC)    • Systolic heart failure (HCC)     Chronic class II   • Wears glasses     reading       Past Surgical History:   Procedure Laterality Date   • CARDIAC ABLATION     • CARDIAC CATHETERIZATION Left 2007    2 stents   • CARDIAC ELECTROPHYSIOLOGY PROCEDURE N/A 9/27/2017    Procedure: Implant ICD - bi ventricular;  Surgeon: Evan Leroy DO;  Location:  [facility];  Service:    • CARDIAC ELECTROPHYSIOLOGY PROCEDURE N/A 12/12/2019    Procedure: AV node ablation- hold coumadin one day;  Surgeon: Laci Wiley MD;  Location:  [facility];  Service: Cardiovascular   • COLONOSCOPY      > 10 years ago   • GALLBLADDER SURGERY     • HIP SURGERY  2020   • LIVER BIOPSY      x 3   • OTHER SURGICAL HISTORY      Melanoma removal of back    • PROSTATE BIOPSY     • REPLACEMENT TOTAL KNEE BILATERAL Bilateral        Family History   Problem Relation Age of Onset   • Cancer Mother    • Heart attack Father        Social History     Socioeconomic History   • Marital status:    Tobacco Use   • Smoking status: Never   • Smokeless tobacco: Never   Substance and Sexual Activity   • Alcohol use: No   • Drug use: No   • Sexual activity: Defer         Current Outpatient Medications:    •  atorvastatin (LIPITOR) 40 MG tablet, Take 40 mg by mouth Daily., Disp: , Rfl:   •  carvedilol (COREG) 12.5 MG tablet, Take 12.5 mg by mouth 2 (Two) Times a Day With Meals. 1.5 TABLET TWICE DAILY, Disp: , Rfl:   •  DULoxetine (CYMBALTA) 30 MG capsule, Take 30 mg by mouth Daily., Disp: , Rfl:   •  finasteride (PROSCAR) 5 MG tablet, Take 5 mg by mouth Every Morning., Disp: , Rfl:   •  gabapentin (NEURONTIN) 600 MG tablet, Take 600 mg by mouth 3 (Three) Times a Day., Disp: , Rfl:   •  glipizide (GLUCOTROL XL) 2.5 MG 24 hr tablet, Take 2.5 mg by mouth Daily., Disp: , Rfl:   •  Insulin Degludec (Tresiba) 100 UNIT/ML solution injection, Inject 22 Units under the skin into the appropriate area as directed., Disp: , Rfl:   •  magnesium oxide (MAGOX) 400 (241.3 Mg) MG tablet tablet, Take 400 mg by mouth 2 (Two) Times a Day., Disp: , Rfl:   •  metFORMIN (GLUCOPHAGE) 1000 MG tablet, Take 1,000 mg by mouth Daily With Breakfast., Disp: , Rfl:   •  spironolactone (ALDACTONE) 50 MG tablet, Take 50 mg by mouth Daily., Disp: , Rfl:   •  torsemide (DEMADEX) 100 MG tablet, Take 50 mg by mouth 2 (Two) Times a Day., Disp: , Rfl:   •  valsartan (DIOVAN) 40 MG tablet, Take 40 mg by mouth Daily., Disp: , Rfl:   •  warfarin (COUMADIN) 6 MG tablet, Take 6 mg by mouth Daily. 6 mg every day and 3 on thurs., Disp: , Rfl:     Allergies:   Allergies   Allergen Reactions   • Codeine Other (See Comments)     Stomach pain       Objective   Vital Signs: There were no vitals taken for this visit.    PHYSICAL EXAM  General appearance: Awake, alert, cooperative  Head: Normocephalic, without obvious abnormality, atraumatic  Neck: No JVD  Lungs: Clear to ascultation bilaterally  Heart: Regular rate and rhythm, no murmurs, 2+ LE pulses, no lower extremity swelling  Skin: Skin color, turgor normal, no rashes or lesions  Neurologic: Grossly normal     Lab Results   Component Value Date    GLUCOSE 158 (H) 12/12/2019    CALCIUM 9.5 12/12/2019      12/12/2019    K 3.4 (L) 12/12/2019    CO2 31.0 (H) 12/12/2019    CL 93 (L) 12/12/2019    BUN 32 (H) 12/12/2019    CREATININE 1.48 (H) 12/12/2019    EGFRIFAFRI 56 (L) 12/12/2019    EGFRIFNONA 46 (L) 12/12/2019    BCR 21.6 12/12/2019    ANIONGAP 14.0 12/12/2019     Lab Results   Component Value Date    WBC 10.04 12/12/2019    HGB 13.6 12/12/2019    HCT 42.3 12/12/2019    MCV 96.8 12/12/2019     12/12/2019     Lab Results   Component Value Date    INR 2.09 (H) 12/12/2019    INR 1.54 09/28/2017    INR 1.59 09/26/2017    PROTIME 22.6 (H) 12/12/2019    PROTIME 17.0 (H) 09/28/2017    PROTIME 17.6 (H) 09/26/2017     No results found for: TSH, M9NGQPK, Z2VMVEY, THYROIDAB       Results for orders placed in visit on 07/14/21    Adult Transthoracic Echo Complete W/ Cont if Necessary Per Protocol    Interpretation Summary  · Estimated left ventricular EF = 35% Estimated left ventricular EF was in agreement with the calculated left ventricular EF. Left ventricular ejection fraction appears to be 36 - 40%.  · Left ventricular diastolic function is consistent with (grade Ia w/high LAP) impaired relaxation.  · Moderate to severe tricuspid valve regurgitation is present.  · Estimated right ventricular systolic pressure from tricuspid regurgitation is markedly elevated (>55 mmHg).  · The left ventricular cavity is mild to moderately dilated.  · Left ventricular wall thickness is consistent with mild concentric hypertrophy.  · Left ventricular mass index is increased.  · The right ventricular cavity is mild to moderately dilated.  · Mildly reduced right ventricular systolic function noted.  · The right atrial cavity is mild to moderately dilated.  · Moderate mitral valve regurgitation is present.  · Modetrate TR        reports that he has never smoked. He has never used smokeless tobacco.  Current tobacco users are offered counseling on tobacco cessation and encouraged to discuss options with their primary care provider    ECG  12 Lead    Date/Time: 3/23/2023 12:17 PM  Performed by: Esteban Fitzgerald MD  Authorized by: Esteban Fitzgerald MD   Comparison: compared with previous ECG from 6/24/2021  Similar to previous ECG  Comments: Biventricular paced rhythm with PVCs          \  Advance Care Planning   Advance Care Planning: ACP discussion was held with the patient during this visit. Patient has an advance directive (not in EMR), copy requested.     Assessment & Plan      1. Permanent atrial fibrillation (HCC) (Primary)  - [id number]= 4, he was previously on Coumadin but this is now been switched to Eliquis.  He is doing quite well with this.  Rate controlled with carvedilol.      2. Chronic systolic congestive heart failure (HCC)  -His most recent echocardiogram shows a persistently reduced ejection fraction around 35%.  His biventricular pacing percentage remains slightly below goal at  93% today.  I suspect this is due to PVCs.  I would like to increase his Coreg to 25 mg twice daily, but he would like to discuss this with his primary cardiologist which I think is reasonable.     His ventricular threshold of the right ventricular lead is also significantly elevated.  This is likely going to result in decreased battery longevity going forward.  We discussed the time of his generator change we can see if it is possible to insert a new RV lead.  I would not recommend an extraction for him but I think the risk would outweigh the benefits.  We also briefly discussed the benefits of implanting a new defibrillator lead versus pacemaker lead only would downgrade to a pacemaker system.  I prefer to check a new echo in the fall prior to his battery running out to further assess this.    3. LOIS (obstructive sleep apnea)  - stopped using CPAP recently, encouraged him to restart nightly use.  Follow Up:  No follow-ups on file.      Thank you for allowing me to participate in the care of your patient. Please do not hesitate to contact me with additional  questions or concerns.      Esteban Fitzgerald M.D.  Cardiac Electrophysiologist  [facility]

## 2023-03-23 ENCOUNTER — OFFICE VISIT (OUTPATIENT)
Dept: CARDIOLOGY | Facility: CLINIC | Age: 83
End: 2023-03-23
Payer: MEDICARE

## 2023-03-23 VITALS
BODY MASS INDEX: 32.21 KG/M2 | HEIGHT: 70 IN | DIASTOLIC BLOOD PRESSURE: 70 MMHG | SYSTOLIC BLOOD PRESSURE: 140 MMHG | HEART RATE: 73 BPM | OXYGEN SATURATION: 98 % | WEIGHT: 225 LBS

## 2023-03-23 DIAGNOSIS — Z95.810 BIVENTRICULAR ICD (IMPLANTABLE CARDIOVERTER-DEFIBRILLATOR) IN PLACE: Primary | ICD-10-CM

## 2023-03-23 DIAGNOSIS — I48.21 PERMANENT ATRIAL FIBRILLATION: ICD-10-CM

## 2023-03-23 PROBLEM — R35.0 INCREASED FREQUENCY OF URINATION: Status: ACTIVE | Noted: 2022-05-25

## 2023-03-23 PROCEDURE — 93000 ELECTROCARDIOGRAM COMPLETE: CPT | Performed by: STUDENT IN AN ORGANIZED HEALTH CARE EDUCATION/TRAINING PROGRAM

## 2023-03-23 PROCEDURE — 93284 PRGRMG EVAL IMPLANTABLE DFB: CPT | Performed by: STUDENT IN AN ORGANIZED HEALTH CARE EDUCATION/TRAINING PROGRAM

## 2023-03-23 PROCEDURE — 3077F SYST BP >= 140 MM HG: CPT | Performed by: STUDENT IN AN ORGANIZED HEALTH CARE EDUCATION/TRAINING PROGRAM

## 2023-03-23 PROCEDURE — 99214 OFFICE O/P EST MOD 30 MIN: CPT | Performed by: STUDENT IN AN ORGANIZED HEALTH CARE EDUCATION/TRAINING PROGRAM

## 2023-03-23 PROCEDURE — 3078F DIAST BP <80 MM HG: CPT | Performed by: STUDENT IN AN ORGANIZED HEALTH CARE EDUCATION/TRAINING PROGRAM

## 2023-03-23 RX ORDER — OXYCODONE HYDROCHLORIDE AND ACETAMINOPHEN 5; 325 MG/1; MG/1
1 TABLET ORAL EVERY 6 HOURS
COMMUNITY
Start: 2023-01-10 | End: 2023-03-23

## 2023-03-23 RX ORDER — DOCUSATE SODIUM 100 MG/1
1 CAPSULE, LIQUID FILLED ORAL DAILY
COMMUNITY
Start: 2023-01-15

## 2023-03-23 RX ORDER — DULOXETIN HYDROCHLORIDE 60 MG/1
1 CAPSULE, DELAYED RELEASE ORAL DAILY
COMMUNITY
Start: 2023-03-06

## 2023-03-23 RX ORDER — TRAMADOL HYDROCHLORIDE 50 MG/1
1 TABLET ORAL EVERY 12 HOURS SCHEDULED
COMMUNITY
Start: 2023-01-16 | End: 2023-03-23

## 2023-04-03 ENCOUNTER — TELEPHONE (OUTPATIENT)
Dept: CARDIOLOGY | Facility: CLINIC | Age: 83
End: 2023-04-03
Payer: MEDICARE

## 2023-04-03 NOTE — TELEPHONE ENCOUNTER
Naman at [facility] would like to clarify the dose of Coreg that you would like him to be on? She said that he was already taking 25 mg BID.      (P)[phone]

## 2023-09-10 PROCEDURE — 93296 REM INTERROG EVL PM/IDS: CPT | Performed by: STUDENT IN AN ORGANIZED HEALTH CARE EDUCATION/TRAINING PROGRAM

## 2023-09-10 PROCEDURE — 93295 DEV INTERROG REMOTE 1/2/MLT: CPT | Performed by: STUDENT IN AN ORGANIZED HEALTH CARE EDUCATION/TRAINING PROGRAM

## 2023-10-18 ENCOUNTER — TELEPHONE (OUTPATIENT)
Dept: CARDIOLOGY | Facility: CLINIC | Age: 83
End: 2023-10-18
Payer: MEDICARE

## 2023-10-18 DIAGNOSIS — I42.9 CARDIOMYOPATHY, UNSPECIFIED TYPE: Primary | ICD-10-CM

## 2023-10-18 DIAGNOSIS — I50.22 CHRONIC SYSTOLIC CONGESTIVE HEART FAILURE: ICD-10-CM

## 2023-10-18 NOTE — TELEPHONE ENCOUNTER
Per [employer] remote cardiac device transmission received today, 10/18/2023:     Battery @ JUANITO per Combined Follow-up Report, device JUANITO trigger not received yet.     Pt is taking Eliquis please advise for gen change.     Additionally, tt has follow up EP appointment in [place], [place], with you next Thursday, 10/26/2023. Last EP follow up was 3/23/2023.

## 2023-10-20 NOTE — TELEPHONE ENCOUNTER
Left voice mail message for pt to call the device clinic about BIV ICD gen change, call back phone number provided.    

## 2023-10-25 ENCOUNTER — TELEPHONE (OUTPATIENT)
Dept: CARDIOLOGY | Facility: CLINIC | Age: 83
End: 2023-10-25
Payer: MEDICARE

## 2023-10-25 NOTE — TELEPHONE ENCOUNTER
I spoke with pt's spouse who reports that pt was admitted early AM of 10/23/2023 @ [facility] where he received cardiac stent and gen change to his [employer] BIV ICD. Mrs. Holland states pt is to be discharged tomorrow, 10/26/2023.     Pt is scheduled for a 6 month follow up appointment with Dr. Fitzgerald @ the [facility] outreach site. I'll notify scheduling to reschedule the appointment.     Devi, please cancel the EP case request for gen change.

## 2023-10-27 ENCOUNTER — TELEPHONE (OUTPATIENT)
Dept: CARDIOLOGY | Facility: CLINIC | Age: 83
End: 2023-10-27
Payer: MEDICARE

## 2023-10-27 NOTE — TELEPHONE ENCOUNTER
Remote reading received showing JUANITO on 10/24/23.  On 10/24/23 Radha spoke with wife and she reported they were doing a generator change at the hospital but today's reading does not reflect this. Called Mr Holland and left a message for a return call.

## 2023-11-14 ENCOUNTER — PREP FOR SURGERY (OUTPATIENT)
Dept: OTHER | Facility: HOSPITAL | Age: 83
End: 2023-11-14
Payer: MEDICARE

## 2023-11-14 DIAGNOSIS — Z95.810 ELECTIVE REPLACEMENT OF IMPLANTABLE CARDIOVERTER-DEFIBRILLATOR (ICD) BATTERY REQUIRED: Primary | ICD-10-CM

## 2023-11-14 RX ORDER — ONDANSETRON 2 MG/ML
4 INJECTION INTRAMUSCULAR; INTRAVENOUS EVERY 6 HOURS PRN
OUTPATIENT
Start: 2023-11-14

## 2023-11-14 RX ORDER — SODIUM CHLORIDE 0.9 % (FLUSH) 0.9 %
3 SYRINGE (ML) INJECTION EVERY 12 HOURS SCHEDULED
OUTPATIENT
Start: 2023-11-14

## 2023-11-14 RX ORDER — SODIUM CHLORIDE 9 MG/ML
40 INJECTION, SOLUTION INTRAVENOUS AS NEEDED
OUTPATIENT
Start: 2023-11-14

## 2023-11-14 RX ORDER — CEFAZOLIN SODIUM 2 G/100ML
2 INJECTION, SOLUTION INTRAVENOUS ONCE
OUTPATIENT
Start: 2023-11-14 | End: 2023-11-14

## 2023-11-14 RX ORDER — NITROGLYCERIN 0.4 MG/1
0.4 TABLET SUBLINGUAL
OUTPATIENT
Start: 2023-11-14

## 2023-11-14 RX ORDER — SODIUM CHLORIDE 0.9 % (FLUSH) 0.9 %
10 SYRINGE (ML) INJECTION AS NEEDED
OUTPATIENT
Start: 2023-11-14

## 2023-11-14 RX ORDER — ACETAMINOPHEN 325 MG/1
650 TABLET ORAL EVERY 4 HOURS PRN
OUTPATIENT
Start: 2023-11-14

## 2023-12-04 ENCOUNTER — HOSPITAL ENCOUNTER (OUTPATIENT)
Facility: HOSPITAL | Age: 83
Setting detail: HOSPITAL OUTPATIENT SURGERY
Discharge: HOME | End: 2023-12-04
Attending: STUDENT IN AN ORGANIZED HEALTH CARE EDUCATION/TRAINING PROGRAM | Admitting: STUDENT IN AN ORGANIZED HEALTH CARE EDUCATION/TRAINING PROGRAM
Payer: MEDICARE

## 2023-12-04 VITALS
RESPIRATION RATE: 16 BRPM | OXYGEN SATURATION: 93 % | TEMPERATURE: 97.3 F | WEIGHT: 236 LBS | SYSTOLIC BLOOD PRESSURE: 99 MMHG | BODY MASS INDEX: 33.79 KG/M2 | HEART RATE: 69 BPM | DIASTOLIC BLOOD PRESSURE: 69 MMHG | HEIGHT: 70 IN

## 2023-12-04 DIAGNOSIS — Z95.810 ELECTIVE REPLACEMENT OF IMPLANTABLE CARDIOVERTER-DEFIBRILLATOR (ICD) BATTERY REQUIRED: ICD-10-CM

## 2023-12-04 DIAGNOSIS — I48.21 PERMANENT ATRIAL FIBRILLATION: ICD-10-CM

## 2023-12-04 DIAGNOSIS — I25.5 ISCHEMIC CARDIOMYOPATHY: ICD-10-CM

## 2023-12-04 LAB
ANION GAP SERPL CALCULATED.3IONS-SCNC: 10 MMOL/L (ref 5–15)
BASOPHILS # BLD AUTO: 0.11 10*3/MM3 (ref 0–0.2)
BASOPHILS NFR BLD AUTO: 1.5 % (ref 0–1.5)
BUN SERPL-MCNC: 37 MG/DL (ref 8–23)
BUN/CREAT SERPL: 22.3 (ref 7–25)
CALCIUM SPEC-SCNC: 9.6 MG/DL (ref 8.6–10.5)
CHLORIDE SERPL-SCNC: 93 MMOL/L (ref 98–107)
CO2 SERPL-SCNC: 30 MMOL/L (ref 22–29)
CREAT SERPL-MCNC: 1.66 MG/DL (ref 0.76–1.27)
DEPRECATED RDW RBC AUTO: 50.6 FL (ref 37–54)
EGFRCR SERPLBLD CKD-EPI 2021: 41.2 ML/MIN/1.73
EOSINOPHIL # BLD AUTO: 0.16 10*3/MM3 (ref 0–0.4)
EOSINOPHIL NFR BLD AUTO: 2.2 % (ref 0.3–6.2)
ERYTHROCYTE [DISTWIDTH] IN BLOOD BY AUTOMATED COUNT: 15.7 % (ref 12.3–15.4)
GLUCOSE SERPL-MCNC: 196 MG/DL (ref 65–99)
HCT VFR BLD AUTO: 37.9 % (ref 37.5–51)
HGB BLD-MCNC: 11.4 G/DL (ref 13–17.7)
IMM GRANULOCYTES # BLD AUTO: 0.02 10*3/MM3 (ref 0–0.05)
IMM GRANULOCYTES NFR BLD AUTO: 0.3 % (ref 0–0.5)
INR PPP: 2.43 (ref 0.89–1.12)
LYMPHOCYTES # BLD AUTO: 1.12 10*3/MM3 (ref 0.7–3.1)
LYMPHOCYTES NFR BLD AUTO: 15.4 % (ref 19.6–45.3)
MCH RBC QN AUTO: 26.6 PG (ref 26.6–33)
MCHC RBC AUTO-ENTMCNC: 30.1 G/DL (ref 31.5–35.7)
MCV RBC AUTO: 88.3 FL (ref 79–97)
MONOCYTES # BLD AUTO: 1.03 10*3/MM3 (ref 0.1–0.9)
MONOCYTES NFR BLD AUTO: 14.2 % (ref 5–12)
NEUTROPHILS NFR BLD AUTO: 4.83 10*3/MM3 (ref 1.7–7)
NEUTROPHILS NFR BLD AUTO: 66.4 % (ref 42.7–76)
NRBC BLD AUTO-RTO: 0.3 /100 WBC (ref 0–0.2)
PLATELET # BLD AUTO: 205 10*3/MM3 (ref 140–450)
PMV BLD AUTO: 11.1 FL (ref 6–12)
POTASSIUM SERPL-SCNC: 4.8 MMOL/L (ref 3.5–5.2)
PROTHROMBIN TIME: 26.6 SECONDS (ref 12.2–14.5)
RBC # BLD AUTO: 4.29 10*6/MM3 (ref 4.14–5.8)
SODIUM SERPL-SCNC: 133 MMOL/L (ref 136–145)
WBC NRBC COR # BLD AUTO: 7.27 10*3/MM3 (ref 3.4–10.8)

## 2023-12-04 PROCEDURE — 33264 RMVL & RPLCMT DFB GEN MLT LD: CPT | Performed by: STUDENT IN AN ORGANIZED HEALTH CARE EDUCATION/TRAINING PROGRAM

## 2023-12-04 PROCEDURE — 25010000002 ONDANSETRON PER 1 MG: Performed by: STUDENT IN AN ORGANIZED HEALTH CARE EDUCATION/TRAINING PROGRAM

## 2023-12-04 PROCEDURE — 85610 PROTHROMBIN TIME: CPT | Performed by: STUDENT IN AN ORGANIZED HEALTH CARE EDUCATION/TRAINING PROGRAM

## 2023-12-04 PROCEDURE — C1882 AICD, OTHER THAN SING/DUAL: HCPCS | Performed by: STUDENT IN AN ORGANIZED HEALTH CARE EDUCATION/TRAINING PROGRAM

## 2023-12-04 PROCEDURE — C1889 IMPLANT/INSERT DEVICE, NOC: HCPCS | Performed by: STUDENT IN AN ORGANIZED HEALTH CARE EDUCATION/TRAINING PROGRAM

## 2023-12-04 PROCEDURE — 25010000002 FENTANYL CITRATE (PF) 50 MCG/ML SOLUTION: Performed by: STUDENT IN AN ORGANIZED HEALTH CARE EDUCATION/TRAINING PROGRAM

## 2023-12-04 PROCEDURE — 80048 BASIC METABOLIC PNL TOTAL CA: CPT | Performed by: PHYSICIAN ASSISTANT

## 2023-12-04 PROCEDURE — 25010000002 CEFAZOLIN PER 500 MG: Performed by: PHYSICIAN ASSISTANT

## 2023-12-04 PROCEDURE — 85025 COMPLETE CBC W/AUTO DIFF WBC: CPT | Performed by: STUDENT IN AN ORGANIZED HEALTH CARE EDUCATION/TRAINING PROGRAM

## 2023-12-04 PROCEDURE — 99152 MOD SED SAME PHYS/QHP 5/>YRS: CPT | Performed by: STUDENT IN AN ORGANIZED HEALTH CARE EDUCATION/TRAINING PROGRAM

## 2023-12-04 PROCEDURE — 99153 MOD SED SAME PHYS/QHP EA: CPT | Performed by: STUDENT IN AN ORGANIZED HEALTH CARE EDUCATION/TRAINING PROGRAM

## 2023-12-04 PROCEDURE — 25010000002 BUPIVACAINE 0.5 % SOLUTION: Performed by: STUDENT IN AN ORGANIZED HEALTH CARE EDUCATION/TRAINING PROGRAM

## 2023-12-04 PROCEDURE — 25010000002 MIDAZOLAM PER 1 MG: Performed by: STUDENT IN AN ORGANIZED HEALTH CARE EDUCATION/TRAINING PROGRAM

## 2023-12-04 DEVICE — CARDIAC RESYNCHRONIZATION THERAPY DEFIBRILLATOR
Type: IMPLANTABLE DEVICE | Status: FUNCTIONAL
Brand: VIGILANT™ X4 CRT-D

## 2023-12-04 DEVICE — HEMOST ABS SURGICEL PWDR 3GM: Type: IMPLANTABLE DEVICE | Status: FUNCTIONAL

## 2023-12-04 DEVICE — ENV PM AIGISRX ANTIBAC RESORB 2.9X3.3IN LG: Type: IMPLANTABLE DEVICE | Status: FUNCTIONAL

## 2023-12-04 RX ORDER — LIDOCAINE HYDROCHLORIDE 10 MG/ML
INJECTION, SOLUTION EPIDURAL; INFILTRATION; INTRACAUDAL; PERINEURAL
Status: DISCONTINUED | OUTPATIENT
Start: 2023-12-04 | End: 2023-12-04 | Stop reason: HOSPADM

## 2023-12-04 RX ORDER — ACETAMINOPHEN 650 MG/1
650 SUPPOSITORY RECTAL EVERY 4 HOURS PRN
Status: DISCONTINUED | OUTPATIENT
Start: 2023-12-04 | End: 2023-12-04 | Stop reason: HOSPADM

## 2023-12-04 RX ORDER — MIDAZOLAM HYDROCHLORIDE 1 MG/ML
INJECTION INTRAMUSCULAR; INTRAVENOUS
Status: DISCONTINUED | OUTPATIENT
Start: 2023-12-04 | End: 2023-12-04 | Stop reason: HOSPADM

## 2023-12-04 RX ORDER — CARVEDILOL 6.25 MG/1
6.25 TABLET ORAL 2 TIMES DAILY WITH MEALS
COMMUNITY

## 2023-12-04 RX ORDER — SODIUM CHLORIDE 9 MG/ML
40 INJECTION, SOLUTION INTRAVENOUS AS NEEDED
Status: DISCONTINUED | OUTPATIENT
Start: 2023-12-04 | End: 2023-12-04 | Stop reason: HOSPADM

## 2023-12-04 RX ORDER — SODIUM CHLORIDE 0.9 % (FLUSH) 0.9 %
10 SYRINGE (ML) INJECTION AS NEEDED
Status: DISCONTINUED | OUTPATIENT
Start: 2023-12-04 | End: 2023-12-04 | Stop reason: HOSPADM

## 2023-12-04 RX ORDER — FENTANYL CITRATE 50 UG/ML
INJECTION, SOLUTION INTRAMUSCULAR; INTRAVENOUS
Status: DISCONTINUED | OUTPATIENT
Start: 2023-12-04 | End: 2023-12-04 | Stop reason: HOSPADM

## 2023-12-04 RX ORDER — ONDANSETRON 2 MG/ML
4 INJECTION INTRAMUSCULAR; INTRAVENOUS EVERY 6 HOURS PRN
Status: DISCONTINUED | OUTPATIENT
Start: 2023-12-04 | End: 2023-12-04 | Stop reason: HOSPADM

## 2023-12-04 RX ORDER — BUMETANIDE 2 MG/1
4 TABLET ORAL 2 TIMES DAILY
COMMUNITY

## 2023-12-04 RX ORDER — WARFARIN SODIUM 6 MG/1
6 TABLET ORAL
COMMUNITY

## 2023-12-04 RX ORDER — ONDANSETRON 2 MG/ML
INJECTION INTRAMUSCULAR; INTRAVENOUS
Status: DISCONTINUED | OUTPATIENT
Start: 2023-12-04 | End: 2023-12-04 | Stop reason: HOSPADM

## 2023-12-04 RX ORDER — SODIUM CHLORIDE 0.9 % (FLUSH) 0.9 %
3 SYRINGE (ML) INJECTION EVERY 12 HOURS SCHEDULED
Status: DISCONTINUED | OUTPATIENT
Start: 2023-12-04 | End: 2023-12-04 | Stop reason: HOSPADM

## 2023-12-04 RX ORDER — NITROGLYCERIN 0.4 MG/1
0.4 TABLET SUBLINGUAL
Status: DISCONTINUED | OUTPATIENT
Start: 2023-12-04 | End: 2023-12-04 | Stop reason: HOSPADM

## 2023-12-04 RX ORDER — LEVOTHYROXINE SODIUM 0.03 MG/1
25 TABLET ORAL
COMMUNITY

## 2023-12-04 RX ORDER — BUPIVACAINE HYDROCHLORIDE 5 MG/ML
INJECTION, SOLUTION PERINEURAL
Status: DISCONTINUED | OUTPATIENT
Start: 2023-12-04 | End: 2023-12-04 | Stop reason: HOSPADM

## 2023-12-04 RX ORDER — ACETAMINOPHEN 325 MG/1
650 TABLET ORAL EVERY 4 HOURS PRN
Status: DISCONTINUED | OUTPATIENT
Start: 2023-12-04 | End: 2023-12-04 | Stop reason: HOSPADM

## 2023-12-04 RX ORDER — ASPIRIN 81 MG/1
81 TABLET, CHEWABLE ORAL DAILY
COMMUNITY

## 2023-12-04 RX ORDER — SPIRONOLACTONE 100 MG/1
100 TABLET, FILM COATED ORAL DAILY
COMMUNITY

## 2023-12-04 RX ORDER — DAPAGLIFLOZIN 10 MG/1
10 TABLET, FILM COATED ORAL DAILY
COMMUNITY

## 2023-12-04 RX ORDER — CEFAZOLIN SODIUM 2 G/100ML
2 INJECTION, SOLUTION INTRAVENOUS ONCE
Status: DISCONTINUED | OUTPATIENT
Start: 2023-12-04 | End: 2023-12-04

## 2023-12-04 RX ADMIN — SODIUM CHLORIDE 2000 MG: 900 INJECTION INTRAVENOUS at 14:35

## 2023-12-04 NOTE — H&P
Pre-cardiac EP Procedure History and Physical  [facility] at [facility]      Patient:  Omero Holland  :  1940  MRN: [id number]    PCP:  Dimitrios Morales III, MD  PHONE:  [phone]    DATE: 2023  ID: Omero Holland is a 83 y.o. male from [place], [place]    CC: Cardiomyopathy, Device at [facility]    Problem List:  1. Mixed CM        A)CM dating back to  EF 30% by [facility]       B)[facility] ostial CX BMS         C)MPS  EF 25% Inferior ischemia        D)C LAD BMS 3/17        E)Echocardiogram EF 35%         F)Echocardiogram EF 25% 17        G)MUGA  17 EF 35%        H)Chronic class III SHF with admission to [facility] in setting of PAF with RVR, invitation of Amiodarone and diuresis.  Cozaar stopped secondary to Hypotension and kidney issues       I ) s/p BIV ICD [employer]        J) Echo 2021: EF 35% Left ventricular diastolic function is consistent with (grade Ia w/high LAP) impaired relaxation. Moderate to severe TR. RVSP > 55. LV mild to moderately dilated. RV mild to moderate dilation, reduced function. Moderate MR.      K ) 2021: Hospitalization for fluid overload, was up 20 lbs.  2. Hx of remote Aflutter RFA    3. Frequent PVC's/LBBB   4. HTN   5. HLD   6. LOIS/CPAP   7. BPH/Elevated PSA followed by Dr. Flynn   8. Hypothyroidism   9. Atrial fibrillation              A. Formerly on amiodarone              B. CHADSVASC = 4, on coumadin              C. AV node ablation, BiV ICD implant   10. Vertigo              A. Fell early 2022, one night hospital stay at [facility]  11. Diabetes Mellitus      BRIEF HPI: Mr. Holland is an 82 y/o male with the above medical histroy who presents for [facility] [place] change. The device was initially implanted in . [facility] notification on 10/24/23. He was recently admitted in [facility] in October with complaints of chest pain and acute on chronic  "HFrEF. We do not have access to these records and the patient is a poor historian. It sounds like he had a heart cath without intervention and was put on a Bumex drip. His ACEi/ARB was discontinued due to renal disease or hypotension. He also reports that his Eliquis was switched to Coumadin due to cost.     Allergies:      Allergies   Allergen Reactions    Ketamine Confusion    Codeine Other (See Comments)     Stomach pain       MEDICATIONS:  Current Outpatient Medications   Medication Instructions    aspirin 81 mg, Oral, Daily    atorvastatin (LIPITOR) 40 mg, Oral, Daily    bumetanide (BUMEX) 4 mg, Oral, 2 Times Daily    carvedilol (COREG) 6.25 mg, Oral, 2 Times Daily With Meals    docusate sodium (COLACE) 100 MG capsule 1 capsule, Oral, Daily    DULoxetine (CYMBALTA) 60 MG capsule 1 capsule, Oral, Daily    Farxiga 10 mg, Oral, Daily    gabapentin (NEURONTIN) 600 mg, Oral, 3 Times Daily    levothyroxine (SYNTHROID, LEVOTHROID) 25 mcg, Oral, Every Early Morning    magnesium oxide (MAGOX) 400 mg, Oral, 2 Times Daily    spironolactone (ALDACTONE) 100 mg, Oral, Daily    Tresiba 34 Units, Subcutaneous, Daily    warfarin (COUMADIN) 6 mg, Oral, Daily Warfarin       Past medical & surgical history, social and family history reviewed in the electronic medical record.    ROS: Pertinent positives listed in the HPI and problem list above. All others reviewed and negative.     Physical Exam:   /80 (BP Location: Left arm, Patient Position: Lying)   Pulse 71   Temp 97.3 °F (36.3 °C) (Temporal)   Resp 18   Ht 177.8 cm (70\")   Wt 107 kg (236 lb)   SpO2 94%   BMI 33.86 kg/m²     Constitutional:    Well-appearing 83 y.o. y/o adult in no acute distress        Heart:    Regular rhythm and normal rate, systolic murmur noted, no rubs or gallops   Lungs:     Clear to auscultation bilaterally, no wheezes, rhales or rhonchi, nonlabored respirations on room air   Abdomen:     Soft, nontender   Extremities:   No gross " "deformities, 2+ pedal and pretibial edema bilaterally       Neuro:  Psych:      No gross focal deficits  Mood and behavior appropriate for situation       Labs and Diagnostic Data:  Lab Results   Component Value Date    GLUCOSE 196 (H) 12/04/2023    BUN 37 (H) 12/04/2023    CREATININE 1.66 (H) 12/04/2023    EGFR 41.2 (L) 12/04/2023    BCR 22.3 12/04/2023    K 4.8 12/04/2023    CO2 30.0 (H) 12/04/2023    CALCIUM 9.6 12/04/2023     Lab Results   Component Value Date    WBC 7.27 12/04/2023    HGB 11.4 (L) 12/04/2023    HCT 37.9 12/04/2023    MCV 88.3 12/04/2023     12/04/2023     No results found for: \"HGBA1C\"  No results found for: \"CHOL\", \"CHLPL\", \"TRIG\", \"HDL\", \"LDL\", \"LDLDIRECT\"     Tele: V-paced rhythm    IMPRESSION:  Mr. Holland is an 83-year-old male with a history of ischemic cardiomyopathy s/p BiV ICD and permanent atrial fibrillation s/p AV node ablation who presents for generator change.     PLAN:  Procedure to perform: BiV ICD generator change. Risks, benefits and alternatives to the procedure explained to the patient and he understands and wishes to proceed.     Scribed by Ahsan Mcintyre PA-C for Dr. Esteban Fitzgerald MD on 12/4/2023      "

## 2023-12-14 ENCOUNTER — OFFICE VISIT (OUTPATIENT)
Dept: CARDIOLOGY | Facility: CLINIC | Age: 83
End: 2023-12-14
Payer: MEDICARE

## 2023-12-14 DIAGNOSIS — Z95.810 BIVENTRICULAR AUTOMATIC IMPLANTABLE CARDIOVERTER DEFIBRILLATOR IN SITU: Primary | ICD-10-CM

## 2023-12-14 RX ORDER — METHYLPREDNISOLONE 4 MG/1
TABLET ORAL
Qty: 21 TABLET | Refills: 0 | Status: SHIPPED | OUTPATIENT
Start: 2023-12-14

## 2023-12-14 NOTE — PROGRESS NOTES
I saw patient for his wound check today.  He does have a small hematoma.  Incision is well-healing.  He does have a large rash that seems to start around his device site and spread throughout his body.  I suspect this is likely an allergy to the TyRex pouch.  He did not previously have any antibiotic allergies listed.  He is significantly symptomatic with this so I have ordered him a Medrol Dosepak and he is also going to take as needed Benadryl.  Will check back with him next week to see how he is doing.

## 2023-12-19 PROBLEM — E11.22 TYPE 2 DIABETES MELLITUS WITH CHRONIC KIDNEY DISEASE, WITHOUT LONG-TERM CURRENT USE OF INSULIN: Status: ACTIVE | Noted: 2022-01-17

## 2023-12-19 PROBLEM — R55 SYNCOPE: Status: ACTIVE | Noted: 2023-01-01

## 2023-12-19 NOTE — CASE MANAGEMENT/SOCIAL WORK
Discharge Planning Assessment  [facility]     Patient Name: Omero Holland  MRN: [id number]  Today's Date: 12/19/2023    Admit Date: 12/19/2023    Plan: Home with wife & home health   Discharge Needs Assessment       Row Name 12/19/23 1429       Living Environment    People in Home spouse    Name(s) of People in Home wifeEstrella    Current Living Arrangements home    Potentially Unsafe Housing Conditions none    Primary Care Provided by self    Provides Primary Care For no one    Family Caregiver if Needed spouse    Quality of Family Relationships unable to assess    Able to Return to Prior Arrangements yes       Resource/Environmental Concerns    Resource/Environmental Concerns none    Transportation Concerns none       Transition Planning    Patient/Family Anticipates Transition to home with family    Patient/Family Anticipated Services at Transition     Transportation Anticipated family or friend will provide       Discharge Needs Assessment    Readmission Within the Last 30 Days no previous admission in last 30 days    Equipment Currently Used at Home cane, quad tip;cane, straight;oxygen;walker, rolling;rollator;wheelchair    Concerns to be Addressed discharge planning    Anticipated Changes Related to Illness none    Equipment Needed After Discharge none                   Discharge Plan       Row Name 12/19/23 4766       Plan    Plan Home with wife & home health    Patient/Family in Agreement with Plan yes    Plan Comments Spoke to patient in room to initiate discharge planning. Patient lives at home with wife in [facility]. Prior to admission, he was independent with ADL's. Wife assists with meds. He has a quad/straight cane, rolling walker, rollator, wheelchair. He is current with [facility] for SN & PT. Verified with Valent[facility]. Nocturnal home oxygen at 2L. Patient unsure of agency. Left message with wife Estrella. His PCP is Dimitrios Morales. He has drug coverage. [facility] His goal is home with wife and home health. His brother will transport when discharged. CM will continue to follow.    Final Discharge Disposition Code 06 - home with home health care                  Continued Care and Services - Admitted Since 12/19/2023    Coordination has not been started for this encounter.       Expected Discharge Date and Time       Expected Discharge Date Expected Discharge Time    Dec 22, 2023            Demographic Summary       Row Name 12/19/23 1428       General Information    Admission Type inpatient    Arrived From emergency department    Referral Source admission list    Reason for Consult discharge planning    Preferred Language English                   Functional Status       Row Name 12/19/23 1428       Functional Status    Usual Activity Tolerance moderate    Current Activity Tolerance moderate       Functional Status, IADL    Medications assistive person    Meal Preparation independent    Housekeeping independent    Laundry independent    Shopping assistive person                   Psychosocial    No documentation.                  Abuse/Neglect    No documentation.                  Legal    No documentation.                  Substance Abuse    No documentation.                  Patient Forms    No documentation.                     Rosie Faulkner RN

## 2023-12-19 NOTE — PROGRESS NOTES
"Pharmacy Consult  -  Warfarin  Omero Holland is a  83 y.o. male   Height - 177.8 cm (70\")  Weight - 109 kg (239 lb 6.4 oz)    Consulting Service - hospitalist  Indication - atrial fibrillation  Goal INR - 2-3  Home Regimen:   - warfarin 6 mg daily  Bridge Therapy: No     Drug-Drug Interactions with current regimen:   Aspirin, levothyroxine, torsemide    Warfarin Dosing During Admission:  Date             INR             Dose                Education Provided:    Discharge Follow up:  Following Provider -  Follow up time range or appointment -    Labs:  Results from last 7 days   Lab Units 12/19/23  0316   INR  5.47*   APTT seconds 43.8*   HEMOGLOBIN g/dL 12.0*   HEMATOCRIT % 38.7   PLATELETS 10*3/mm3 212     Results from last 7 days   Lab Units 12/19/23  0316   SODIUM mmol/L 126*   POTASSIUM mmol/L 3.5   CHLORIDE mmol/L 83*   CO2 mmol/L 28.0   BUN mg/dL 63*   CREATININE mg/dL 2.15*   CALCIUM mg/dL 8.8   BILIRUBIN mg/dL 1.1   ALK PHOS U/L 85   ALT (SGPT) U/L 13   AST (SGOT) U/L 19   GLUCOSE mg/dL 269*     Current dietary intake:   Diet Order   Procedures    NPO Diet NPO Type: Strict NPO       Assessment/Plan:   1. INR is currently SUPRAtherapeutic.  Hold warfarin for now.  2. Daily PT-INR labs have been ordered.  3. Pharmacy will adjust Mr. Holland's warfarin dose as needed based on daily INR result.    Thank you  Rudy Quesada, PharmD, BCPS  12/19/2023  05:57 EST    "

## 2023-12-19 NOTE — CONSULTS
Patient Care Team:  Dimitrios Morales III, MD as PCP - General (Family Medicine)  Gumaro Fitzpatrick MD as Consulting Physician (Nephrology)  Esteban Fitzgerald MD as Consulting Physician (Cardiology)  Chucho Ayala MD as Consulting Physician (Cardiology)    Chief complaint: QUE on CKD stage III  Hyponatremia   Hematoma at the ICD insertion site.   Syncope  Vtach      History of Present Illness: 83 y.o. male with PMH significant for A-fib on Coumadin, nonischemic cardiomyopathy/systolic heart failure, CAD, DM 2, HTN, HLD, CKD 3, obesity, chronic respiratory failure on 2 LNC, who presented originally to [facility] with complaint of syncope who was found to have concerns for episodes of V. tach. Patient was later transferred to [facility] for evaluation of worsening hematoma around the ICD insertion site, possible sepsis. Nephrology has been consulted for evaluation of worsening renal function. Patient has known hx of CKD stage III. Follows with [facility]. Last known stable cr btw 1.4-1.6 mg/dl. Cr on this admission 2.1>2.43 BUN 73 Na ~ 126, 127. Lactic acid 5.8. Patient admits drinking excessive water at home. He has been instructed at OSH to limit water intake. Patient has hx of advance CHF and takes loop diuretics, MRA and SGLT-2inh at home.     Review of Systems   Constitutional: Negative.    HENT: Negative.     Respiratory: Negative.     Cardiovascular:  Positive for leg swelling.   Gastrointestinal: Negative.    Genitourinary: Negative.    Musculoskeletal: Negative.    Hematological: Negative.    Psychiatric/Behavioral: Negative.          Past Medical History:   Diagnosis Date    A-fib     Arthritis     Back pain     Cardiomyopathy     Non-ischemic    Coronary artery disease     2 stents    Diabetes mellitus     dx 3- 4 years ago- checks fsbs daily    Dizziness     Dyslipidemia     Hyperlipidemia     Hypertension     H/o    Kidney disease     HX- sees a  nephrologist    Melanoma     Obesity     LOIS (obstructive sleep apnea)     cpap    PAF (paroxysmal atrial fibrillation) 08/21/2017    /atrial flutter    Palpitations     Tachy Palpitations    Psoriatic arthritis     Systolic heart failure     Chronic class II    Wears glasses     reading   ,   Past Surgical History:   Procedure Laterality Date    CARDIAC ABLATION      CARDIAC CATHETERIZATION Left 2007    2 stents    CARDIAC ELECTROPHYSIOLOGY PROCEDURE N/A 9/27/2017    Procedure: Implant ICD - bi ventricular;  Surgeon: Evan Leroy DO;  Location:  [facility];  Service:     CARDIAC ELECTROPHYSIOLOGY PROCEDURE N/A 12/12/2019    Procedure: AV node ablation- hold coumadin one day;  Surgeon: Laci Wiley MD;  Location:  [facility];  Service: Cardiovascular    COLONOSCOPY      > 10 years ago    GALLBLADDER SURGERY      HIP SURGERY  2020    ICD GENERATOR REPLACEMENT N/A 12/4/2023    Procedure: ICD Generator Change - Ot-Qlyndwttypc-LUP-Hold Eliquis for 2 days;  Surgeon: Esteban Fitzgerald MD;  Location:  [facility];  Service: Cardiovascular;  Laterality: N/A;  Hx AVNA. Battery @ [facility] on 10/24/2023.    LIVER BIOPSY      x 3    OTHER SURGICAL HISTORY      Melanoma removal of back     PROSTATE BIOPSY      REPLACEMENT TOTAL KNEE BILATERAL Bilateral    ,   Family History   Problem Relation Age of Onset    Cancer Mother     Heart attack Father    ,   Social History     Socioeconomic History    Marital status:    Tobacco Use    Smoking status: Never    Smokeless tobacco: Never   Vaping Use    Vaping Use: Never used   Substance and Sexual Activity    Alcohol use: No    Drug use: No    Sexual activity: Defer     E-cigarette/Vaping    E-cigarette/Vaping Use Never User     Passive Exposure No     Counseling Given No      E-cigarette/Vaping Substances    Nicotine No     THC No     CBD No     Flavoring No      E-cigarette/Vaping Devices    Disposable No     Pre-filled or  Refillable Cartridge No     Refillable Tank No     Pre-filled Pod No          ,   Medications Prior to Admission   Medication Sig Dispense Refill Last Dose    aspirin 81 MG chewable tablet Chew 1 tablet Daily.       atorvastatin (LIPITOR) 40 MG tablet Take 1 tablet by mouth Daily.       carvedilol (COREG) 6.25 MG tablet Take 1 tablet by mouth Daily.   Patient Taking Differently    dapagliflozin Propanediol (Farxiga) 10 MG tablet Take 10 mg by mouth Daily.       docusate sodium (COLACE) 100 MG capsule Take 1 capsule by mouth Daily.       DULoxetine (CYMBALTA) 60 MG capsule Take 1 capsule by mouth Daily.   Patient Taking Differently    gabapentin (NEURONTIN) 600 MG tablet Take 1 tablet by mouth 3 (Three) Times a Day.       Insulin Degludec (Tresiba) 100 UNIT/ML solution injection Inject 34 Units under the skin into the appropriate area as directed Daily.       levothyroxine (SYNTHROID, LEVOTHROID) 25 MCG tablet Take 1 tablet by mouth Every Morning.       magnesium oxide (MAGOX) 400 (241.3 Mg) MG tablet tablet Take 1 tablet by mouth 2 (Two) Times a Day.       spironolactone (ALDACTONE) 100 MG tablet Take 1 tablet by mouth Daily.       warfarin (COUMADIN) 6 MG tablet Take 1 tablet by mouth Daily.       Cholecalciferol 25 MCG (1000 UT) tablet Take 1 tablet by mouth Daily.       nitroglycerin (NITROSTAT) 0.4 MG SL tablet Place 1 tablet under the tongue Every 5 (Five) Minutes As Needed for Chest Pain. Take no more than 3 doses in 15 minutes.       torsemide (DEMADEX) 100 MG tablet Take 1 tablet by mouth Daily.      , and Scheduled Meds:  amiodarone, 400 mg, Oral, TID  aspirin, 81 mg, Oral, Daily  atorvastatin, 40 mg, Oral, Daily  carvedilol, 6.25 mg, Oral, Daily  cholecalciferol, 1,000 Units, Oral, Daily  DULoxetine, 60 mg, Oral, Daily  gabapentin, 300 mg, Oral, Q8H  insulin lispro, 2-7 Units, Subcutaneous, 4x Daily AC & at Bedtime  levothyroxine, 25 mcg, Oral, Q AM  methylPREDNISolone sodium succinate, 80 mg, Intravenous,  "Q8H  senna-docusate sodium, 2 tablet, Oral, BID  sodium chloride, 10 mL, Intravenous, Q12H  spironolactone, 100 mg, Oral, Daily  warfarin (COUMADIN) (dosing per levels), , Does not apply, Daily       Objective     Vital Signs  Temp:  [96.7 °F (35.9 °C)-97.7 °F (36.5 °C)] 97.7 °F (36.5 °C)  Heart Rate:  [] 105  Resp:  [18-20] 18  BP: ()/(75-82) 101/75    No intake/output data recorded.  No intake/output data recorded.    Physical Exam  Vitals (Swelling noted at the ICD site.) reviewed.   Constitutional:       General: He is not in acute distress.     Appearance: Normal appearance. He is not ill-appearing.   HENT:      Head: Normocephalic.      Nose: Nose normal.      Mouth/Throat:      Mouth: Mucous membranes are moist.   Eyes:      Pupils: Pupils are equal, round, and reactive to light.   Cardiovascular:      Rate and Rhythm: Normal rate and regular rhythm.      Pulses: Normal pulses.   Pulmonary:      Effort: Pulmonary effort is normal.      Comments: On supp o2.  Abdominal:      General: Abdomen is flat.   Musculoskeletal:      Right lower leg: Edema present.      Left lower leg: Edema present.   Skin:     General: Skin is warm.   Neurological:      General: No focal deficit present.      Mental Status: He is alert and oriented to person, place, and time.         Results Review:    I reviewed the patient's new clinical results.    WBC WBC   Date Value Ref Range Status   12/19/2023 3.58 3.40 - 10.80 10*3/mm3 Final      HGB Hemoglobin   Date Value Ref Range Status   12/19/2023 12.0 (L) 13.0 - 17.7 g/dL Final      HCT Hematocrit   Date Value Ref Range Status   12/19/2023 38.7 37.5 - 51.0 % Final      Platlets No results found for: \"LABPLAT\"   MCV MCV   Date Value Ref Range Status   12/19/2023 81.3 79.0 - 97.0 fL Final          Sodium Sodium   Date Value Ref Range Status   12/19/2023 127 (L) 136 - 145 mmol/L Final   12/19/2023 126 (L) 136 - 145 mmol/L Final   12/19/2023 126 (L) 136 - 145 mmol/L Final    " "  Potassium Potassium   Date Value Ref Range Status   12/19/2023 4.2 3.5 - 5.2 mmol/L Final   12/19/2023 4.6 3.5 - 5.2 mmol/L Final   12/19/2023 3.5 3.5 - 5.2 mmol/L Final      Chloride Chloride   Date Value Ref Range Status   12/19/2023 82 (L) 98 - 107 mmol/L Final   12/19/2023 82 (L) 98 - 107 mmol/L Final   12/19/2023 83 (L) 98 - 107 mmol/L Final      CO2 CO2   Date Value Ref Range Status   12/19/2023 28.0 22.0 - 29.0 mmol/L Final   12/19/2023 29.0 22.0 - 29.0 mmol/L Final   12/19/2023 28.0 22.0 - 29.0 mmol/L Final      BUN BUN   Date Value Ref Range Status   12/19/2023 73 (H) 8 - 23 mg/dL Final   12/19/2023 70 (H) 8 - 23 mg/dL Final   12/19/2023 63 (H) 8 - 23 mg/dL Final      Creatinine Creatinine   Date Value Ref Range Status   12/19/2023 2.40 (H) 0.76 - 1.27 mg/dL Final   12/19/2023 2.27 (H) 0.76 - 1.27 mg/dL Final   12/19/2023 2.15 (H) 0.76 - 1.27 mg/dL Final      Calcium Calcium   Date Value Ref Range Status   12/19/2023 8.8 8.6 - 10.5 mg/dL Final   12/19/2023 9.1 8.6 - 10.5 mg/dL Final   12/19/2023 8.8 8.6 - 10.5 mg/dL Final      PO4 No results found for: \"CAPO4\"   Albumin Albumin   Date Value Ref Range Status   12/19/2023 3.4 (L) 3.5 - 5.2 g/dL Final      Magnesium Magnesium   Date Value Ref Range Status   12/19/2023 2.9 (H) 1.6 - 2.4 mg/dL Final      Uric Acid No results found for: \"URICACID\"         Assessment & Plan       Syncope    Cardiomyopathy    Hypertension    Dyslipidemia    Chronic systolic congestive heart failure    Morbidly obese    LOIS (obstructive sleep apnea)    PAF (paroxysmal atrial fibrillation)    Biventricular automatic implantable cardioverter defibrillator in situ    Type 2 diabetes mellitus with chronic kidney disease, without long-term current use of insulin      Assessment & Plan    QUE on CKD stage III: Last known stable cr btw 1.4-1.6 mg/dl. Cr on this admission 2.1>2.43 BUN 73. Urine Na <20, Urine osm 320 UA trace ketone and trace protein     Cardiorenal syndrome. Hx of CHF w " EF 35% elevated RSVP in the past. Dependent edema noted.     Hyponatremia: urine Na <20 Urine osm 320. Urine studies not reliable in the setting of chronic HF. Suspect hyponatremia due to QEU on CKD, excessive fluid intake and total body volume overload.     ICD site hematoma: Developed allergic reaction after placing tyRex following generator change 12/14/23. Received Steroids.     Dependent edema: Chronic in the setting of CHF    Afib/Vtach: Presented w syncope at OSH.     Lactic acidosis: Worsening. Sepsis related?. Bp stable. Not requiring inotropic support. Monitor for now    Supratheraputic INR: Coumadin on hold.     Recs  - Uptrend in renal parameters hemodynamic variation vs steroid related.   - Limit FW intake to 1.2 liter/day for hyponatremia  - Given total body volume overload. Continue loop diuretics bumex 2 mg x 1 and spironolactone 100mg daily   - Hold SGLT-2inh  - Serial lactate monitoring.   - Daily labs. Strict I/O  - If no improvement in hyponatremia will try Samsca  - No indication of dialysis.           I discussed the patients findings and my recommendations with patient    Alex Betancur MD  12/19/23  15:14 EST

## 2023-12-19 NOTE — CONSULTS
Cardiology Consult     Omero Holland  1940  [phone]  There is no work phone number on file.    12/19/23    DATE OF ADMISSION: 12/19/2023  [address]    Dimitrios Morales III, MD  [address] [facility] [address]  Referring Provider: Julio Reid MD     Reason for Consult: VT, Syncope    Problem List:  1. Mixed Cardiomyopathy/CAD:       A)CM dating back to 2007 EF 30% by [facility]       B)[facility] ostial CX BMS 2016        C)MPS 2/17 EF 25% Inferior ischemia        D)[facility] LAD BMS 3/17        E)Echocardiogram EF 35% 5/17        F)Echocardiogram EF 25% 6/16/17        G)MUGA  7/25/17 EF 35%        H)Chronic class III SHF with admission to [facility] in setting of PAF with RVR, invitation of Amiodarone and diuresis.  Cozaar stopped secondary to Hypotension and kidney issues       I ) s/p BIV ICD [employer] 2017       J) Echo 07/14/2021: EF 35% Left ventricular diastolic function is consistent with (grade Ia w/high LAP) impaired relaxation. Moderate to severe TR. RVSP > 55. LV mild to moderately dilated. RV mild to moderate dilation, reduced function. Moderate MR.      K ) 12/2021: Hospitalization for fluid overload, was up 20 lbs.      L) BiV ICD gen change  2. Hx of remote Aflutter RFA 2013   3. Frequent PVC's/LBBB - previously treated with Amiodarone, discontinued 2021   4. HTN   5. HLD   6. LOIS/CPAP   7. BPH/Elevated PSA followed by Dr. Flynn   8. Hypothyroidism   9. Atrial fibrillation              A. Formerly on amiodarone              B. CHADSVASC = 4, on coumadin              C. AV node ablation 2019, BiV ICD implant 2017   D. Amiodarone discontinued 2021, due to potential longterm side effects, and patient in permanent AFIB.   10. Vertigo              A. Fell early February 2022, one night hospital stay at [facility]  11. Diabetes Mellitus      History of Present Illness:   Omero Holland is an 83-year-old male with above-stated past medical history including chronic  "systolic congestive heart failure (EF 35%) with [place]iventricular [place] permanent atrial fibrillation on Coumadin status post AV node ablation in 2017, frequent PVCs, previously treated with amiodarone which was discontinued in 2021, coronary artery disease, diabetes mellitus type 2, hypertension, dyslipidemia, chronic kidney disease who was admitted to [facility] earlier this morning as a transfer from [facility] for syncope and ventricular tachycardia.  Patient recently underwent biventricular ICD generator change on 12/4/2023 with Dr. Fitzgerald.  A few days after his device change, he developed a hematoma at his device site and also a diffuse rash across his chest torso and back.  He was seen by Dr. Fitzgerald in clinic on 12/14/2023 and started on a Medrol Dosepak.  His rashes continue to worsen.  He states on Friday, 12/15/2023 he had a sudden onset episode of dizziness and feeling a \"glitch\" in his head and passed out in his kitchen floor momentarily.  This was witnessed by his wife.  On Sunday, he had another episode with the same symptoms.  He presented to [facility] with these complaints yesterday.  Device interrogation revealed episodes of arrhythmia with atrial fibrillation and also ventricular tachycardia successfully converted with ATP.  He was started on IV amiodarone and transferred to [facility].  Upon arrival to [facility] this morning, his EKG revealed prolonged QTc and his amiodarone was stopped.  He has not had any further ventricular arrhythmias since admission.  He currently denies chest pain or shortness of breath.  He states prior to his episodes of syncope on Friday and Sunday, he was in his normal state of health.  However, he notes times of chest pressure mainly occurring when he is sitting down in the evenings watching TV that can last 30 minutes.  He states he has had this for years but may be " slightly worse over the past several months.  He has a history of bare-metal stent placement in 2017 but no further ischemic evaluation since that time.  Laboratory data upon admission revealed supratherapeutic INR, and pharmacy has been consulted to adjust Coumadin dosing.  Coumadin is currently on hold.  Also found to have QUE and hyponatremia.  proBNP greater than 20,000.  Creatinine 2.15 and previously was 1.662 weeks ago.  At bedtime troponin initially 67 trending down to 64.  Lactic acid initially 4.0, now at 4.7.  He has been given Solu-Medrol at the outside hospital for his rash, along with Pepcid and Benadryl..      Allergies   Allergen Reactions    Ketamine Confusion    Codeine Other (See Comments)     Stomach pain       Prior to Admission Medications       Prescriptions Last Dose Informant Patient Reported? Taking?    aspirin 81 MG chewable tablet   Yes Yes    Chew 1 tablet Daily.    atorvastatin (LIPITOR) 40 MG tablet   Yes Yes    Take 1 tablet by mouth Daily.    carvedilol (COREG) 6.25 MG tablet Patient Taking Differently Self Yes Yes    Take 1 tablet by mouth Daily.    Cholecalciferol 25 MCG (1000 UT) tablet  Self Yes No    Take 1 tablet by mouth Daily.    dapagliflozin Propanediol (Farxiga) 10 MG tablet   Yes Yes    Take 10 mg by mouth Daily.    docusate sodium (COLACE) 100 MG capsule   Yes Yes    Take 1 capsule by mouth Daily.    DULoxetine (CYMBALTA) 60 MG capsule Patient Taking Differently Self Yes Yes    Take 1 capsule by mouth Daily.    gabapentin (NEURONTIN) 600 MG tablet   Yes Yes    Take 1 tablet by mouth 3 (Three) Times a Day.    Insulin Degludec (Tresiba) 100 UNIT/ML solution injection   Yes Yes    Inject 34 Units under the skin into the appropriate area as directed Daily.    levothyroxine (SYNTHROID, LEVOTHROID) 25 MCG tablet   Yes Yes    Take 1 tablet by mouth Every Morning.    magnesium oxide (MAGOX) 400 (241.3 Mg) MG tablet tablet   Yes Yes    Take 1 tablet by mouth 2 (Two) Times a Day.     nitroglycerin (NITROSTAT) 0.4 MG SL tablet  Self Yes No    Place 1 tablet under the tongue Every 5 (Five) Minutes As Needed for Chest Pain. Take no more than 3 doses in 15 minutes.    spironolactone (ALDACTONE) 100 MG tablet   Yes Yes    Take 1 tablet by mouth Daily.    torsemide (DEMADEX) 100 MG tablet  Self Yes No    Take 1 tablet by mouth Daily.    warfarin (COUMADIN) 6 MG tablet   Yes Yes    Take 1 tablet by mouth Daily.              Current Facility-Administered Medications:     acetaminophen (TYLENOL) tablet 650 mg, 650 mg, Oral, Q4H PRN **OR** acetaminophen (TYLENOL) 160 MG/5ML oral solution 650 mg, 650 mg, Oral, Q4H PRN **OR** acetaminophen (TYLENOL) suppository 650 mg, 650 mg, Rectal, Q4H PRN, Christiaen Martin APRN    aspirin chewable tablet 81 mg, 81 mg, Oral, Daily, Christiane Martin APRN, 81 mg at 12/19/23 0832    atorvastatin (LIPITOR) tablet 40 mg, 40 mg, Oral, Daily, Christiane Martin APRN, 40 mg at 12/19/23 0834    sennosides-docusate (PERICOLACE) 8.6-50 MG per tablet 2 tablet, 2 tablet, Oral, BID, 2 tablet at 12/19/23 0832 **AND** polyethylene glycol (MIRALAX) packet 17 g, 17 g, Oral, Daily PRN **AND** bisacodyl (DULCOLAX) EC tablet 5 mg, 5 mg, Oral, Daily PRN **AND** bisacodyl (DULCOLAX) suppository 10 mg, 10 mg, Rectal, Daily PRN, Christiane Martin APRN    carvedilol (COREG) tablet 6.25 mg, 6.25 mg, Oral, Daily, Christiane Martin APRN, 6.25 mg at 12/19/23 0833    cholecalciferol (VITAMIN D3) tablet 1,000 Units, 1,000 Units, Oral, Daily, Christiane Martin APRN, 1,000 Units at 12/19/23 0834    dextrose (D50W) (25 g/50 mL) IV injection 25 g, 25 g, Intravenous, Q15 Min PRN, Christiane Martin APRN    dextrose (GLUTOSE) oral gel 15 g, 15 g, Oral, Q15 Min PRN, Christiane Martin APRN    DULoxetine (CYMBALTA) DR capsule 60 mg, 60 mg, Oral, Daily, Christiane Martin APRN, 60 mg at 12/19/23 0833    gabapentin (NEURONTIN) capsule 300 mg, 300 mg, Oral, Q8H, Christiane Martin APRN    glucagon (GLUCAGEN)  injection 1 mg, 1 mg, Intramuscular, Q15 Min PRN, Christiane Martin, APRN    Insulin Lispro (humaLOG) injection 2-7 Units, 2-7 Units, Subcutaneous, 4x Daily AC & at Bedtime, Christiane Martin APRN, 4 Units at 12/19/23 0835    levothyroxine (SYNTHROID, LEVOTHROID) tablet 25 mcg, 25 mcg, Oral, Q AM, Christiane Martin APRN    melatonin tablet 5 mg, 5 mg, Oral, Nightly PRN, Christiane Martin APRN, 5 mg at 12/19/23 0327    methylPREDNISolone sodium succinate (SOLU-Medrol) injection 80 mg, 80 mg, Intravenous, Q8H, Vinicio Saucedo MD    Pharmacy to dose warfarin, , Does not apply, Continuous PRN, Christiane Martin APRJAQUELINE    sodium chloride 0.9 % flush 10 mL, 10 mL, Intravenous, Q12H, Christiane Martin APRN, 10 mL at 12/19/23 0835    sodium chloride 0.9 % flush 10 mL, 10 mL, Intravenous, PRN, Christiane Martin, APRN    sodium chloride 0.9 % infusion 40 mL, 40 mL, Intravenous, PRN, Christiane Martin, APRN    spironolactone (ALDACTONE) tablet 100 mg, 100 mg, Oral, Daily, Christiane Martin APRN, 100 mg at 12/19/23 0833    warfarin (COUMADIN) (dosing per levels), , Does not apply, Daily, Rudy Quesada, PharmD    Social History     Socioeconomic History    Marital status:    Tobacco Use    Smoking status: Never    Smokeless tobacco: Never   Vaping Use    Vaping Use: Never used   Substance and Sexual Activity    Alcohol use: No    Drug use: No    Sexual activity: Defer       Family History   Problem Relation Age of Onset    Cancer Mother     Heart attack Father        REVIEW OF SYSTEMS:   CONSTITUTIONAL:         No weight loss, fever, chills, weakness + fatigue.   HEENT:                            No visual loss, blurred vision, double vision, yellow sclerae.                                             No hearing loss, congestion, sore throat.   SKIN:                                + rashes, - urticaria, ulcers, sores.     RESPIRATORY:               No shortness of breath, hemoptysis, cough, sputum.   GI:                                      No anorexia, nausea, vomiting, diarrhea. No abdominal pain, melena.   :                                   No burning on urination, hematuria or increased frequency.  ENDOCRINE:                   No diaphoresis, cold or heat intolerance. No polyuria or polydipsia.   NEURO:                            No headache, +dizziness, +syncope,-  paralysis, ataxia, or parasthesias.                                            No change in bowel or bladder control. No history of CVA/TIA  MUSCULOSKELETAL:    No muscle, back pain, joint pain or stiffness.   HEMATOLOGY:               No anemia, bleeding, bruising. No history of DVT/PE.  PSYCH:                            No history of depression, anxiety    Vitals:    12/19/23 0200 12/19/23 0418 12/19/23 0450 12/19/23 0715   BP:  95/78  101/75   BP Location:  Left arm  Left arm   Patient Position:  Lying  Lying   Pulse: 98  106 105   Resp:  20  18   Temp:  97.7 °F (36.5 °C)  97.7 °F (36.5 °C)   TempSrc:  Oral  Oral   SpO2:   90% 99%   Weight:       Height:             Vital Sign Min/Max for last 24 hours  Temp  Min: 96.7 °F (35.9 °C)  Max: 97.7 °F (36.5 °C)   BP  Min: 95/78  Max: 110/82   Pulse  Min: 98  Max: 120   Resp  Min: 18  Max: 20   SpO2  Min: 90 %  Max: 99 %   Flow (L/min)  Min: 2  Max: 2    No intake or output data in the 24 hours ending 12/19/23 1116          Physical Exam:  GEN: Well nourished, Well- developed  No acute distress  HEENT: Normocephalic, Atraumatic, PERRLA, moist mucous membranes  NECK: supple, NO JVD, no thyromegaly, no lymphadenopathy  CARDIAC: S1S2  RRR no murmur, gallop, rub  LUNGS: Clear to ausculation, normal respiratory effort  ABDOMEN: Soft, nontender, normal bowel sounds  EXTREMITIES:No gross deformities,  No clubbing, cyanosis, or edema  SKIN: Diffuse erythematour rash on chest, torso, and back. + hematoma at device site.  NEURO: No focal deficits  PSYCHIATRIC: Normal affect and mood      I personally viewed and interpreted the  patient's EKG/Telemetry/lab data    Data:   Results from last 7 days   Lab Units 12/19/23  0316   WBC 10*3/mm3 3.58   HEMOGLOBIN g/dL 12.0*   HEMATOCRIT % 38.7   PLATELETS 10*3/mm3 212     Results from last 7 days   Lab Units 12/19/23  0841 12/19/23  0316   SODIUM mmol/L 126* 126*   POTASSIUM mmol/L 4.6 3.5   CHLORIDE mmol/L 82* 83*   CO2 mmol/L 29.0 28.0   BUN mg/dL 70* 63*   CREATININE mg/dL 2.27* 2.15*   GLUCOSE mg/dL 239* 269*      Results from last 7 days   Lab Units 12/19/23  0316   HEMOGLOBIN A1C % 9.80*     Results from last 7 days   Lab Units 12/19/23  0316   TSH uIU/mL 3.150   FREE T4 ng/dL 1.13     Results from last 7 days   Lab Units 12/19/23  0316   PROBNP pg/mL 20,530.0*     Results from last 7 days   Lab Units 12/19/23  0316   PROTIME Seconds 50.1*   INR  5.47*   APTT seconds 43.8*     Results from last 7 days   Lab Units 12/19/23  0531 12/19/23  0316   HSTROP T ng/L 64* 67*         Results from last 7 days   Lab Units 12/19/23  0316   PROBNP pg/mL 20,530.0*     No intake or output data in the 24 hours ending 12/19/23 1116    Chest X-Ray:  Imaging Results (Last 24 Hours)       Procedure Component Value Units Date/Time    XR Chest 1 View [[id number]] Collected: 12/19/23 0532     Updated: 12/19/23 0535    Narrative:      XR CHEST 1 VW    Date of Exam: 12/19/2023 5:25 AM EST    Indication: possible CHF, chest pain    Comparison: 9/28/2017.    Findings:  There are no airspace consolidations. No pleural fluid. No pneumothorax. The pulmonary vasculature appears within normal limits. The heart appears enlarged. There is a stable left subclavian ICD/pacemaker device. Unremarkable. No acute osseous   abnormality identified.      Impression:      Impression:  No acute cardiopulmonary process. Stable cardiomegaly.        Electronically Signed: Tiesha Salinas MD    12/19/2023 5:32 AM EST    Workstation ID: [id number]            Telemetry: V paced 101 bpm  EKG: V paced 106 bpm QTc 545 ms    [employer]  biventricular ICD interrogation: RV threshold noted at 2.7  2 episodes of atrial fibrillation with RVR and triggered ventricular tachycardia with successful ATP and termination of VT.        Assessment and Plan:   Ventricular tachycardia/PVCs:  -Episodes of ventricular tachycardia with associated syncopal episodes.  Device interrogation today reveals dual arrhythmia of atrial fibrillation and ventricular tachycardia, and appears that his atrial fibrillation and pacing setting at VVR on his device has triggered his VT.  ATP successful in terminating VT.  Will take off VVR setting.  -Of note, he has been on amiodarone in the past and tolerated it well.  This was used to treat PVCs and was discontinued in 2021 for unknown reasons.  Reviewed EKG with Dr. Banda today, and we will initiate p.o. amiodarone 400 mg 3 times daily x 7 days, then 400 mg daily x 30 days then 200 mg daily.  Watch QTc on EKG.  -Also consider ischemic evaluation in setting of ventricular tachycardia and symptoms of chest pressure.  Dr. Fitzgerald to assume care tomorrow and to make further decision.  INR is 5.47 today and would need to wait until INR has decreased prior to left heart catheterization if indicated.    2.  Chronic systolic congestive heart failure/ischemic cardiomyopathy:  -Chest x-ray without evidence of pulmonary edema, BNP is elevated, but he does not seem to have evidence of fluid overload at this time.  -Echocardiogram pending today.  Last echocardiogram in 2021 with EF of 35%  -Consider ischemic evaluation in setting of VT  -Carvedilol, Aldactone, no ACE/ARB/Entresto due to QUE    3.  Rash/hematoma:  -Suspect possible allergy/allergic reaction to the TyRex pouch which was implanted during his generator change on 12/4/2023.  Dr. Fitzgerald to consider hematoma evacuation and removal of the TyRex pouch tomorrow pending his INR.  -Continue to hold Coumadin, n.p.o. after midnight  -Discussed with hospitalist, and he will order steroids for  treatment of his rash.      4.  Permanent atrial fibrillation:  -Previous AV node ablation in 2019, with device interrogation does appear that he is conducting through and may need to consider redo AV node ablation.  Especially in light that his atrial fibrillation with RVR and pacing triggered ventricular tachycardia in the setting.  -Anticoagulation with Coumadin, INR currently therapeutic and on hold.  Pharm.D. consulted to follow and dose    5.  QUE:  -Hospitalist have consulted nephrology          Electronically signed by BOOGIE Arechiga, 12/19/23, 11:16 AM EST.

## 2023-12-19 NOTE — LETTER
[facility]  [address] [facility] [address]  [phone]        December 21, 2023      Patient: Omero Holland  YOB: 1940  Date of Visit: 12/18/2023      For inpatient hospice admission  Referring Dr Yulissa Ozuna  Attending [facility]  Certifying Dr Anali Angel

## 2023-12-19 NOTE — PROGRESS NOTES
"Pharmacy Consult  -  Warfarin  Edselam Holland is a  83 y.o. male   Height - 177.8 cm (70\")  Weight - 109 kg (239 lb 6.4 oz)    Consulting Service - hospitalist  Indication - atrial fibrillation  Goal INR - 2-3  Home Regimen:   - warfarin 6 mg daily  Bridge Therapy: No     Drug-Drug Interactions with current regimen:   Aspirin, levothyroxine, torsemide    Warfarin Dosing During Admission:  Date             INR             Dose                Education Provided:    Discharge Follow up:  Following Provider -  Follow up time range or appointment -    Labs:          Invalid input(s): \"LABALBU\", \"PROT\"  Current dietary intake:   Diet Order   Procedures    NPO Diet NPO Type: Strict NPO       Assessment/Plan:   1. INR is currently therapeutic.  Continue warfarin 6 mg daily  2. Daily PT-INR labs have been ordered.  3. Pharmacy will adjust Mr. Holland's warfarin dose as needed based on daily INR result.    Thank you  Rudy Quesada, PharmD, [facility]  12/19/2023  04:22 EST  "

## 2023-12-19 NOTE — H&P
"    [facility] Medicine Services  HISTORY AND PHYSICAL    Patient Name: Omero Holland  : 1940  MRN: [id number]  Primary Care Physician: Dimitrios Morales III, MD  Date of admission: 2023    Subjective   Subjective     Chief Complaint:  Syncope     HPI:  Omero Holland is a 83 y.o. male with PMH significant for A-fib on Coumadin, nonischemic cardiomyopathy/systolic heart failure, CAD, DM 2, HTN, HLD, CKD 3, obesity, chronic respiratory failure on 2 LNC, who presented originally to [facility] with complaint of syncope.  He states that for the past several days he has had multiple episodes of syncope.  He states he is fully lost consciousness and woke up on the floor.  He states that prior to the event, he feels a \"blip\" in front of his face and then wakes up on the floor.  The episodes never last longer than a few seconds.  Of note, he did recently have generator upgrade for his BiV ICD on 2023.  Since that time, he has had diffuse pruritic red rash over his trunk and his bilateral groin.  Upon arrival to the OSH, interrogation of his BiV ICD noted episodes of V. tach.  He was started on amiodarone drip.  Decision was made to transfer to [facility] for higher level of care.  Upon arrival to [facility], he has no complaints.  He will be admitted to hospital medicine for further evaluation.        Review of Systems   Constitutional:  Positive for activity change. Negative for appetite change, chills, diaphoresis, fatigue and fever.   HENT: Negative.  Negative for congestion, sinus pressure, sore throat and trouble swallowing.    Eyes: Negative.  Negative for visual disturbance.   Respiratory:  Positive for shortness of breath. Negative for cough, chest tightness and wheezing.    Cardiovascular:  Positive for palpitations and leg swelling. Negative for chest pain.   Gastrointestinal:  Negative for abdominal distention, abdominal pain, constipation, " diarrhea, nausea and vomiting.   Endocrine: Negative.  Negative for polydipsia and polyphagia.   Genitourinary:  Negative for difficulty urinating, dysuria, frequency and urgency.   Musculoskeletal:  Positive for gait problem. Negative for arthralgias, back pain, joint swelling, myalgias and neck pain.   Skin:  Positive for rash and wound. Negative for color change.   Allergic/Immunologic: Negative.  Negative for immunocompromised state.   Neurological:  Positive for dizziness, syncope, weakness and light-headedness. Negative for seizures, facial asymmetry, speech difficulty, numbness and headaches.   Hematological: Negative.  Does not bruise/bleed easily.   Psychiatric/Behavioral:  Positive for confusion. The patient is not nervous/anxious.             Personal History     Past Medical History:   Diagnosis Date    A-fib     Arthritis     Back pain     Cardiomyopathy     Non-ischemic    Coronary artery disease     2 stents    Diabetes mellitus     dx 3- 4 years ago- checks fsbs daily    Dizziness     Dyslipidemia     Hyperlipidemia     Hypertension     H/o    Kidney disease     HX- sees a nephrologist    Melanoma     Obesity     LOIS (obstructive sleep apnea)     cpap    PAF (paroxysmal atrial fibrillation) 08/21/2017    /atrial flutter    Palpitations     Tachy Palpitations    Psoriatic arthritis     Systolic heart failure     Chronic class II    Wears glasses     reading         Oncology Problem List:  Adenocarcinoma of prostate (04/15/2021; Status: Active)  Oncology/Hematology History       Past Surgical History:   Procedure Laterality Date    CARDIAC ABLATION      CARDIAC CATHETERIZATION Left 2007    2 stents    CARDIAC ELECTROPHYSIOLOGY PROCEDURE N/A 9/27/2017    Procedure: Implant ICD - bi ventricular;  Surgeon: Evan Leroy DO;  Location: [facility] INVASIVE LOCATION;  Service:     CARDIAC ELECTROPHYSIOLOGY PROCEDURE N/A 12/12/2019    Procedure: AV node ablation- hold coumadin one day;  Surgeon: Inez  Laci MONTES DE OCA MD;  Location:  [facility] INVASIVE LOCATION;  Service: Cardiovascular    COLONOSCOPY      > 10 years ago    GALLBLADDER SURGERY      HIP SURGERY  2020    ICD GENERATOR REPLACEMENT N/A 12/4/2023    Procedure: ICD Generator Change - Wj-Pusfmgmgyga-RAF-Hold Eliquis for 2 days;  Surgeon: Esteban Fitzgerald MD;  Location:  [facility] INVASIVE LOCATION;  Service: Cardiovascular;  Laterality: N/A;  Hx AVNA. Battery @ [facility] on 10/24/2023.    LIVER BIOPSY      x 3    OTHER SURGICAL HISTORY      Melanoma removal of back     PROSTATE BIOPSY      REPLACEMENT TOTAL KNEE BILATERAL Bilateral        Family History:  family history includes Cancer in his mother; Heart attack in his father.     Social History:  reports that he has never smoked. He has never used smokeless tobacco. He reports that he does not drink alcohol and does not use drugs.  Social History     Social History Narrative    Not on file       Medications:  DULoxetine, Insulin Degludec, aspirin, atorvastatin, carvedilol, cholecalciferol, dapagliflozin Propanediol, docusate sodium, gabapentin, levothyroxine, magnesium oxide, nitroglycerin, spironolactone, torsemide, and warfarin    Allergies   Allergen Reactions    Ketamine Confusion    Codeine Other (See Comments)     Stomach pain       Objective   Objective     Vital Signs:   Temp:  [96.7 °F (35.9 °C)] 96.7 °F (35.9 °C)  Heart Rate:  [100-120] 100  Resp:  [20] 20  BP: (104-110)/(77-82) 110/82  Flow (L/min):  [2] 2    Physical Exam   Constitutional: Awake, alert, no acute distress  Eyes: PERRLA, sclerae anicteric, no conjunctival injection  HENT: NCAT, mucous membranes moist  Neck: Supple, no thyromegaly, no lymphadenopathy, trachea midline  Respiratory: Clear to auscultation bilaterally, decreased bases, nonlabored respirations   Cardiovascular: tachycardic, no murmurs, rubs, or gallops, palpable pedal pulses bilaterally  Gastrointestinal: Positive bowel sounds, soft, nontender, distended  Musculoskeletal: mild  bilateral ankle edema, no clubbing or cyanosis to extremities  Psychiatric: Appropriate affect, cooperative  Neurologic: Oriented x 3, strength symmetric in all extremities, Cranial Nerves grossly intact to confrontation, speech clear  Skin: diffuse red rash over trunk down to bilateral groin, surgical wound left upper chest CDI, firm hematoma noted      Result Review:  I have personally reviewed the results from the time of this admission to 12/19/2023 03:08 EST and agree with these findings:  []  Laboratory list / accordion  []  Microbiology  []  Radiology  [x]  EKG/Telemetry   []  Cardiology/Vascular   []  Pathology  []  Old records  []  Other:  Most notable findings include: OSH labs  WBC 8.3, RBC 4.9, Hgb 12.2, HCT 40.4,   PT/INR 56.3, 5.83  , K+ 3.3, chloride 83, CO2 31, anion gap 8, BUN 61, creatinine 2.22, GFR 29, glucose 299, CA 9.1, magnesium 2.8, bilirubin 1.3, AST 34, ALT 13, alk phos 101  HS troponin 83    LAB RESULTS:                              Brief Urine Lab Results       None          Microbiology Results (last 10 days)       ** No results found for the last 240 hours. **            No radiology results from the last 24 hrs    Results for orders placed in visit on 07/14/21    Adult Transthoracic Echo Complete W/ Cont if Necessary Per Protocol    Interpretation Summary  · Estimated left ventricular EF = 35% Estimated left ventricular EF was in agreement with the calculated left ventricular EF. Left ventricular ejection fraction appears to be 36 - 40%.  · Left ventricular diastolic function is consistent with (grade Ia w/high LAP) impaired relaxation.  · Moderate to severe tricuspid valve regurgitation is present.  · Estimated right ventricular systolic pressure from tricuspid regurgitation is markedly elevated (>55 mmHg).  · The left ventricular cavity is mild to moderately dilated.  · Left ventricular wall thickness is consistent with mild concentric hypertrophy.  · Left ventricular  mass index is increased.  · The right ventricular cavity is mild to moderately dilated.  · Mildly reduced right ventricular systolic function noted.  · The right atrial cavity is mild to moderately dilated.  · Moderate mitral valve regurgitation is present.  · Modetrate TR      Assessment & Plan   Assessment & Plan       Syncope    Cardiomyopathy    Hypertension    Dyslipidemia    Chronic systolic congestive heart failure    Morbidly obese    LOIS (obstructive sleep apnea)    PAF (paroxysmal atrial fibrillation)    Biventricular automatic implantable cardioverter defibrillator in situ    Type 2 diabetes mellitus with chronic kidney disease, without long-term current use of insulin    83 y.o. male with PMH significant for A-fib on Coumadin, nonischemic cardiomyopathy/systolic heart failure, CAD, DM 2, HTN, HLD, CKD 3, obesity, chronic respiratory failure on 2 LNC, who presented originally to [facility] with complaint of syncope who was found to have concerns for episodes of V. tach.    Syncope  V. tach  - Recent BiV ICD generator upgrade 12/4/2023  - Cardiology consult in the a.m., Dr. Fitzgerald  - Device interrogation performed at OSH (paperwork with chart)  - Amiodarone started at OSH, discontinued upon arrival secondary to prolonged QTc  - CT head negative per OSH report  - CBC, CMP, proBNP, troponin, magnesium, PT/INR, lactic acid, TSH pending  - UA pending    QUE on CKD 3  - OSH noted creatinine 2.22, baseline~1.4-1.6  - Repeat CMP pending    Cardiomyopathy  Systolic heart failure  - S/p BiV ICD 2017 with generator change 12/4/2023  - Daily weight  - Continue torsemide and spironolactone    Rash  - Has been on Medrol Dosepak outpatient  - Solu-Medrol 120 given at OSH  - Benadryl and Pepcid given at OSH    Elevated troponin  - 83 at OSH, repeat pending  - EKG    Supratherapeutic INR  - Pharmacy to dose Coumadin    Hyponatremia  -   - Urine studies  - Repeat sodium pending    PAF  -  Currently rate controlled  - On Coumadin, current INR at OSH 5.83, repeat PT/INR pending  - Pharmacy to dose  - Amiodarone drip started at OSH, did discontinue upon arrival secondary to prolonged QTc  - S/p AV node ablation  - S/p Bi V ICD Yugma original 2017, recent generator change 12/4/2023  - Continue carvedilol    Diabetes mellitus 2  - FSBG ACHS  - SS insulin  - Hemoglobin A1c  - On Farxiga and Tresiba, hold    Hypothyroid  - TSH pending  - Levothyroxine daily    Dyslipidemia  - Continue Lipitor    LOIS  - CPAP at at bedtime      DVT prophylaxis:  On Coumadin    CODE STATUS:         Expected Discharge  Expected discharge date/ time has not been documented.      This note has been completed as part of a split-shared workflow.     Signature: Electronically signed by COLE Rachel, 12/19/23, 3:45 AM EST.        Attending   Admission Attestation       I have performed an independent face-to-face diagnostic evaluation including performing an independent physical examination.  The documented plan of care above was reviewed and developed with the advanced practice clinician (APC).  I have updated the HPI as appropriate.    Brief HPI    Male with history of congestive heart failure, coronary disease, A-fib, ICD implanted pacemaker for nonischemic cardiomyopathy here with multiple episodes of syncope, interrogation showing vtach episodes. Generator replaced 2 weeks ago c/b hematoma and rash at site.    Attending Physical Exam:  Temp:  [96.7 °F (35.9 °C)-97.7 °F (36.5 °C)] 97.7 °F (36.5 °C)  Heart Rate:  [] 106  Resp:  [20] 20  BP: ()/(77-82) 95/78  Flow (L/min):  [2] 2    AAOx3  Comfortable  Hematoma left chest, non tender.  Blanching rash on chest with urticaria and excoriations  Lungs with BL rales at bases  Heart grade 2 systolic murmur  Abd soft, nontender  2+ edema BL    Assessment and Plan:    Continue telemetry. Stop amio gtt due to worsening Qtc. No further events. Cards to see  today.    QUE and hyponatremia. Suspect hypervolemia as he has worsening orthopnea and worse BNP. Start diuresis and monitor BMP.    Continue steroids.    Holding coumadin, INR supra therapeutic    See assessment and plan documented by APC above and updated/edited by me as appropriate.    Julio Reid MD  12/19/23

## 2023-12-19 NOTE — PROGRESS NOTES
Patient was admitted to the hospitalist service last night after midnight.  Today I saw and evaluated the patient at the bedside twice.  Resting in bed in no acute distress and overall feels okay.  He is concerned about the rash also hematoma on the left chest.  Cardiology has seen and evaluated patient.  They think the rash could be secondary to tie Balbir pouch which was implanted during generator change on 12/4/2023.  Patient has received steroids and antihistamines at the other hospital.  Rash has improved some but has not subsided.  We will start Solu-Medrol a little higher dose.  Cardiology may consider taking the pouch out.  Dr. Fitzgerald with electrophysiology will see the patient tomorrow.  Also patient's creatinine has gone from 1.6-2.46.  I have asked nephrology to see the patient and they have already seen the patient today.    -Will check the labs in AM.    -Medicine will follow.

## 2023-12-20 NOTE — CASE MANAGEMENT/SOCIAL WORK
Continued Stay Note  [facility]     Patient Name: Omero Holland  MRN: [id number]  Today's Date: 12/20/2023    Admit Date: 12/19/2023    Plan: Home with wife & home health   Discharge Plan       Row Name 12/20/23 1258       Plan    Plan Home with wife & home health    Patient/Family in Agreement with Plan yes    Plan Comments Discussed patient in MDR. Patient is not medically ready for discharge. Nephrology & cardiology following. Spoke to patient at bedside. His goal is still home with wife and [facility].  CM will continue to follow.    Final Discharge Disposition Code 06 - home with home health care                   Discharge Codes    No documentation.                 Expected Discharge Date and Time       Expected Discharge Date Expected Discharge Time    Dec 22, 2023               Rosie Faulkner RN

## 2023-12-20 NOTE — PLAN OF CARE
Goal Outcome Evaluation:  Plan of Care Reviewed With: patient        Progress: no change  Outcome Evaluation: VSS except for BP (90s/70s), MD made aware and ordered 300ml IV bolus over 3 hours which has been done, last UY=117/69.  Patient denies SOA on 6l/min of Oxygen.  Patient reported he had not voided this am, bladder scan=0, Dr. Martin gave order for a gonzalez.  Gonzalez inserted without problems with aprox 50ml of clear urine returned.  Checked on the pt later in the day and urine was red and only in the catheter, pts gonzalez flushed without problems.   Gonzalez flushed with sterile water and multiple little clots removed.   new order for a Urology consult.  Neph in the room and visulized the red urine and stated to remove the catheter and place an Urologist consult.  Patients SAO2 aprox 82-86% on 6l/min of Oxygen, RT was called and requested a high flow nasal canula.  MD notified about  the gonzalez, BP, SAO2.   Will continue with current POC.  Patient brought down for CT chest/ABD.  Patient taken to the CT via this writer.  Patient left on a Non-rebreather.  Once the patient was in the supine position the patients face turned purple and became unresponsive, the patient was sat up and CT was not done.  Patient is back in room 620.  Dr. Martin was called and saw the patient.  See new orders.  RT was called in regards of need for the bi-pap.  Currently on non-rebreather.

## 2023-12-20 NOTE — PROGRESS NOTES
"  [facility]  CARDIAC ELECTROPHYSIOLOGY PROGRESS NOTE    Date of Admission: 12/19/2023  Date of Service: 12/20/23    Primary Care Physician: Dimitrios Morales III, MD    Chief Complaint: VT, syncope    Subjective      HPI: Patient lying in bed comfortably.  Reports some mild shortness of breath and fatigue but definitely feels better than yesterday.       Objective   Vitals: /85 (BP Location: Right arm, Patient Position: Lying)   Pulse 77   Temp 97.5 °F (36.4 °C) (Oral)   Resp 16   Ht 177.8 cm (70\")   Wt 108 kg (239 lb)   SpO2 95%   BMI 34.29 kg/m²     Physical Exam:   GENERAL: Alert, cooperative, in no acute distress. Skin scaling across chest and neck from recovering rash  HEART: Paced rate and rhythm with ectopy; 1/6 holosystolic murmur noted  LUNGS: Clear to auscultation bilaterally.  Diminished basilar sounds, no rales, nonlabored breathing  NEUROLOGIC: No gross focal abnormalities  EXTREMITIES: 2+ pretibial/pedal edema  PSYCH: normal mood, behavior    Results:  Results from last 7 days   Lab Units 12/19/23  0316   WBC 10*3/mm3 3.58   HEMOGLOBIN g/dL 12.0*   HEMATOCRIT % 38.7   PLATELETS 10*3/mm3 212     Results from last 7 days   Lab Units 12/19/23  2019 12/19/23  1531 12/19/23  1148 12/19/23  0841   SODIUM mmol/L 127* 126* 127* 126*   POTASSIUM mmol/L  --  3.9 4.2 4.6   CHLORIDE mmol/L  --  81* 82* 82*   CO2 mmol/L  --  28.0 28.0 29.0   BUN mg/dL  --  78* 73* 70*   CREATININE mg/dL  --  2.46* 2.40* 2.27*   GLUCOSE mg/dL  --  250* 290* 239*      Lab Results   Component Value Date    AST 19 12/19/2023    ALT 13 12/19/2023     Results from last 7 days   Lab Units 12/19/23  0316   HEMOGLOBIN A1C % 9.80*         Results from last 7 days   Lab Units 12/19/23  0316   TSH uIU/mL 3.150   FREE T4 ng/dL 1.13         Results from last 7 days   Lab Units 12/19/23  0316   PROTIME Seconds 50.1*   INR  5.47*   APTT seconds 43.8*     Results from last 7 days   Lab Units " 12/19/23  0531 12/19/23  0316   HSTROP T ng/L 64* 67*     Results from last 7 days   Lab Units 12/19/23  0316   PROBNP pg/mL 20,530.0*         Intake/Output Summary (Last 24 hours) at 12/20/2023 0755  Last data filed at 12/19/2023 2110  Gross per 24 hour   Intake 550 ml   Output --   Net 550 ml       I personally reviewed the patient's EKG/Telemetry data    Radiology Data:   XR Chest 1 View    Result Date: 12/19/2023  Impression: No acute cardiopulmonary process. Stable cardiomegaly. Electronically Signed: Tiesha Salinas MD  12/19/2023 5:32 AM EST  Workstation ID: [id number]       Current Medications:  amiodarone, 400 mg, Oral, TID  aspirin, 81 mg, Oral, Daily  atorvastatin, 40 mg, Oral, Daily  carvedilol, 6.25 mg, Oral, Daily  cholecalciferol, 1,000 Units, Oral, Daily  DULoxetine, 60 mg, Oral, Daily  gabapentin, 300 mg, Oral, Q8H  insulin lispro, 2-7 Units, Subcutaneous, 4x Daily AC & at Bedtime  levothyroxine, 25 mcg, Oral, Q AM  methylPREDNISolone sodium succinate, 80 mg, Intravenous, Q8H  senna-docusate sodium, 2 tablet, Oral, BID  sodium chloride, 10 mL, Intravenous, Q12H  spironolactone, 100 mg, Oral, Daily  warfarin (COUMADIN) (dosing per levels), , Does not apply, Daily      Pharmacy to dose warfarin,       Assessment  Ventricular tachycardia/PVCs  BiV ICD generator change 12/4/23  Episodes of ventricular tachycardia with associated syncopal episodes.  Device interrogation 12/19/2023 reveals dual arrhythmia of atrial fibrillation and ventricular tachycardia, and appears that his atrial fibrillation and pacing setting at VVR on his device has triggered his VT.  ATP successful in terminating VT.  Will take off VVR setting   Now loading with amiodarone.  No recurrence over the last 24 hours of VT  Hematoma/rash  Suspect allergic reaction to TyRex pouch from generator change 12/4/2023  Dr. Fitzgerald assessed hematoma 12/20/2023.  Hematoma is soft on exam will likely resolve on its own after normalization of his INR.  No  plans to open up and evacuate at this time.  We will continue to monitor  IM ordered steroids for rash which is improving  Supratherapeutic INR  Goal 2/3. Pharmacy assisting  INR now 6.3 despite holding warfarin likely affected by amiodarone initiation  Continue to hold warfarin, we may decide to give Vitamin K to avoid worsening hematoma  Permanent atrial fibrillation  S/p AV node RFA 2019, device interrogation felt to show possible conduction through AV node 12/19/2023.  Dr. Fitzgerald not convinced.  He thinks this is due to PVCs rather than breakthrough conduction through the AV node  Stroke prophylaxis with Coumadin currently on hold due to supratherapeutic INR as discussed above  Acute on chronic systolic heart failure/ischemic cardiomyopathy  Previous TTE 7/21: LVEF 35%, mildly reduced RV systolic fx, mod MR, RVSP >55  S/p BiV ICD  TTE 12/19/2023: LVEF 21-25%, mod reduced RV systolic fx, mod MR, severe TR, RVSP >55  proBNP 20,000 on presentation.  CXR with no evidence of pulmonary edema.  Physical exam with mild to moderate volume overload  Current GDMT: Carvedilol 6.25 mg bid, Aldactone 100 mg qd. ACEi/ARB/ARNi on hold due to QUE  Previously followed with Dr. Snyder but is transitioning care to Brice Rowe PA-C.   History of CAD  S/p LHCs with BMS to ostial LCx 2016, BMS to LAD 3/17  QUE  Creatinine now 2.9, previous baseline 1.4-1.8    Plan  We will continue to follow and assess hematoma over device  Dr. Fitzgerald assessed hematoma 12/20/2023.  Hematoma is soft on exam will hopefully resolve on its own after normalization of his INR.  No plans to open up and evacuate at this time.  We will continue to monitor  INR continuing to rise after amio initiation. Now 6.3  Continue PO amiodarone for VT and PVC suppression  Will ask general/interventional cardiology to see for recommendations on timeframe/method of ischemic evaluation in setting of history of CAD s/p BMS x2, intermittent chest pressure at rest lasting 30  minutes over the last couple months, and worsened LV dysfunction/VT (although likely R on T cause) as well as assistance with acute systolic heart failure complicated by QUE. May need dobutamine gtt  Nephrology following for QUE and assistance on diuresis    Scribed by Ahsan Mcintyre PA-C for Dr. Esteban Fitzgerald MD    I, Esteban Fitzgerald MD, personally performed the services described in this documentation as scribed by the above named individual in my presence, and it is both accurate and complete.  12/20/2023  13:58 EST

## 2023-12-20 NOTE — PROGRESS NOTES
[facility] Medicine Services  PROGRESS NOTE    Patient Name: Omero Holland  : 1940  MRN: [id number]    Date of Admission: 2023  Primary Care Physician: Dimitrios Morales III, MD    Subjective   Subjective     CC:  Syncope     HPI:  Patient reports feeling better compared to that on admission.  He denies any chest pain or shortness of breath.  States that his rash appears to be improved.  He states he has not made any urine since admission.  Denies fevers, chills, sweats.      Objective   Objective     Vital Signs:   Temp:  [97 °F (36.1 °C)-98.3 °F (36.8 °C)] 98.3 °F (36.8 °C)  Heart Rate:  [69-82] 70  Resp:  [16-18] 18  BP: ()/(69-86) 101/69  Flow (L/min):  [2-40] (P) 40     Physical Exam:  General appearance: alert, awake, oriented, no acute distress   Cardiovascular: Irregularly irregular, systolic murmur, radial pulse full 2/4 BL   Respiratory: Decreased basilar breath sounds, 90% O2 sat on 5 L nasal cannula  Abdomen: Mildly distended, non-tender, no organomegaly, bowel sounds normoactive    Neuro/CNS: alert and oriented x3, normal speech  Skin: raised non tender lesion on L chest wall; scattered erythema across chest     Results Reviewed:  LAB RESULTS:      Lab 23  0805 23  1531 23  1148 23  0841 23  0531 23  0316   WBC 3.90  --   --   --   --   --  3.58   HEMOGLOBIN 11.8*  --   --   --   --   --  12.0*   HEMATOCRIT 37.1*  --   --   --   --   --  38.7   PLATELETS 198  --   --   --   --   --  212   NEUTROS ABS 3.05  --   --   --   --   --  2.89   IMMATURE GRANS (ABS) 0.01  --   --   --   --   --  0.01   LYMPHS ABS 0.40*  --   --   --   --   --  0.49*   MONOS ABS 0.44  --   --   --   --   --  0.17   EOS ABS 0.00  --   --   --   --   --  0.01   MCV 77.9*  --   --   --   --   --  81.3   LACTATE  --  4.3* 5.0* 5.8* 4.7* 3.9* 4.0*   PROTIME 55.8*  --   --   --   --   --  50.1*   APTT  --   --   --   --   --   --   43.8*         Lab 12/20/23  0805 12/19/23  2019 12/19/23  1531 12/19/23  1148 12/19/23  0841 12/19/23  0316   SODIUM 125* 127* 126* 127* 126* 126*   POTASSIUM 4.1  --  3.9 4.2 4.6 3.5   CHLORIDE 82*  --  81* 82* 82* 83*   CO2 23.0  --  28.0 28.0 29.0 28.0   ANION GAP 20.0*  --  17.0* 17.0* 15.0 15.0   BUN 92*  --  78* 73* 70* 63*   CREATININE 2.94*  --  2.46* 2.40* 2.27* 2.15*   EGFR 20.7*  --  25.7* 26.4* 28.3* 30.2*   GLUCOSE 174*  --  250* 290* 239* 269*   CALCIUM 8.5*  --  9.1 8.8 9.1 8.8   MAGNESIUM  --   --   --   --   --  2.9*   PHOSPHORUS  --   --   --   --   --  4.5   HEMOGLOBIN A1C  --   --   --   --   --  9.80*   TSH  --   --   --   --   --  3.150         Lab 12/19/23  0316   TOTAL PROTEIN 7.3   ALBUMIN 3.4*   GLOBULIN 3.9   ALT (SGPT) 13   AST (SGOT) 19   BILIRUBIN 1.1   ALK PHOS 85         Lab 12/20/23  0805 12/19/23  0531 12/19/23  0316   PROBNP  --   --  20,530.0*   HSTROP T  --  64* 67*   PROTIME 55.8*  --  50.1*   INR 6.27*  --  5.47*                 Brief Urine Lab Results  (Last result in the past 365 days)        Color   Clarity   Blood   Leuk Est   Nitrite   Protein   CREAT   Urine HCG        12/19/23 0645             112.8                 Microbiology Results Abnormal       None            XR Chest 1 View    Result Date: 12/20/2023  XR CHEST 1 VW Date of Exam: 12/20/2023 3:09 PM EST Indication: acute hypoxia Comparison: 12/19/2023 Findings: There is cardiomegaly. Small left pleural effusion with mild left basilar opacities. The right lung is clear. The right costophrenic angle is sharp. There is a multilead left pacer device     Impression: Impression: Cardiomegaly Small left pleural effusion Mild left basilar atelectasis or infiltrate Electronically Signed: Mateo Roberto MD  12/20/2023 3:37 PM EST  Workstation ID: [id number]    Adult Transthoracic Echo Complete w/ Color, Spectral and Contrast if necessary per protocol    Result Date: 12/19/2023    Left ventricular systolic function is  severely decreased. Left ventricular ejection fraction appears to be 21 - 25%.   The left ventricular cavity is moderately dilated.   Left ventricular wall thickness is consistent with moderate concentric hypertrophy.   Left ventricular diastolic function is consistent with (grade Ia w/high LAP) impaired relaxation.   Moderately reduced right ventricular systolic function noted.   The right ventricular cavity is mild to moderately dilated.   The left atrial cavity is severely dilated.   The right atrial cavity is moderately  dilated.   There is severe calcification of the aortic valve mainly affecting the non-coronary, left coronary and right coronary cusp(s).   Moderate mitral valve regurgitation is present.   Severe tricuspid valve regurgitation is present.   Estimated right ventricular systolic pressure from tricuspid regurgitation is markedly elevated (>55 mmHg).   Severe pulmonary hypertension is present. Ejection fraction reduced compared to 2021 study.     XR Chest 1 View    Result Date: 12/19/2023  XR CHEST 1 VW Date of Exam: 12/19/2023 5:25 AM EST Indication: possible CHF, chest pain Comparison: 9/28/2017. Findings: There are no airspace consolidations. No pleural fluid. No pneumothorax. The pulmonary vasculature appears within normal limits. The heart appears enlarged. There is a stable left subclavian ICD/pacemaker device. Unremarkable. No acute osseous abnormality identified.     Impression: Impression: No acute cardiopulmonary process. Stable cardiomegaly. Electronically Signed: Tiesha Salinas MD  12/19/2023 5:32 AM EST  Workstation ID: [id number]     Results for orders placed during the hospital encounter of 12/19/23    Adult Transthoracic Echo Complete w/ Color, Spectral and Contrast if necessary per protocol    Interpretation Summary    Left ventricular systolic function is severely decreased. Left ventricular ejection fraction appears to be 21 - 25%.    The left ventricular cavity is moderately  dilated.    Left ventricular wall thickness is consistent with moderate concentric hypertrophy.    Left ventricular diastolic function is consistent with (grade Ia w/high LAP) impaired relaxation.    Moderately reduced right ventricular systolic function noted.    The right ventricular cavity is mild to moderately dilated.    The left atrial cavity is severely dilated.    The right atrial cavity is moderately  dilated.    There is severe calcification of the aortic valve mainly affecting the non-coronary, left coronary and right coronary cusp(s).    Moderate mitral valve regurgitation is present.    Severe tricuspid valve regurgitation is present.    Estimated right ventricular systolic pressure from tricuspid regurgitation is markedly elevated (>55 mmHg).    Severe pulmonary hypertension is present.    Ejection fraction reduced compared to 2021 study.      Current medications:  Scheduled Meds:amiodarone, 400 mg, Oral, TID   Followed by  [START ON 12/27/2023] amiodarone, 400 mg, Oral, Q24H   Followed by  [START ON 1/26/2024] amiodarone, 200 mg, Oral, Q24H  aspirin, 81 mg, Oral, Daily  atorvastatin, 40 mg, Oral, Daily  cholecalciferol, 1,000 Units, Oral, Daily  DULoxetine, 60 mg, Oral, Daily  gabapentin, 300 mg, Oral, Q8H  insulin lispro, 2-7 Units, Subcutaneous, 4x Daily AC & at Bedtime  levothyroxine, 25 mcg, Oral, Q AM  methylPREDNISolone sodium succinate, 80 mg, Intravenous, Q8H  senna-docusate sodium, 2 tablet, Oral, BID  sodium chloride, 10 mL, Intravenous, Q12H  warfarin (COUMADIN) (dosing per levels), , Does not apply, Daily      Continuous Infusions:Pharmacy to dose warfarin,       PRN Meds:.  acetaminophen **OR** acetaminophen **OR** acetaminophen    senna-docusate sodium **AND** polyethylene glycol **AND** bisacodyl **AND** bisacodyl    dextrose    dextrose    glucagon (human recombinant)    melatonin    ondansetron    Pharmacy to dose warfarin    sodium chloride    sodium chloride    Assessment & Plan    Assessment & Plan     Active Hospital Problems    Diagnosis  POA    **Syncope [R55]  Yes    Type 2 diabetes mellitus with chronic kidney disease, without long-term current use of insulin [E11.22]  Yes    Biventricular automatic implantable cardioverter defibrillator in situ [Z95.810]  Yes    PAF (paroxysmal atrial fibrillation) [I48.0]  Yes    Cardiomyopathy [I42.9]  Yes    Hypertension [I10]  Yes    Dyslipidemia [E78.5]  Yes    Chronic systolic congestive heart failure [I50.22]  Yes    LOIS (obstructive sleep apnea) [G47.33]  Yes    Morbidly obese [E66.01]  Yes      Resolved Hospital Problems   No resolved problems to display.        Brief Hospital Course to date:  Omero Holland is a 83 y.o. male with past medical history significant for atrial fibrillation on Coumadin, ischemic cardiomyopathy, CAD, type 2 diabetes mellitus, hypertension, hyperlipidemia, CKD stage III, obesity, chronic hypoxia on 2 L nasal cannula, was admitted as a transfer from [facility] for syncope and found to have concerns for V. tach.  Patient subsequently noted to have a hematoma associated with BiV ICD, as well as renal failure.     Syncope  VTach  Ischemic Cardiomyopathy  Chronic systolic heart failure  Hematoma overlying BiV ICD generator s/p change 12/4   -Cardiology consulted   -No intervention on hematoma, await INR to normalize   -Started amiodarone, continue    QUE on CKD stage III  Hyponatremia  -Nephrology consulted; no diuresis with Bumex   -N.p.o. after midnight in anticipation for dialysis tomorrow  -Urology consulted for gross hematuria and pain on Setphenson placement; CT A/P pending   -Repeat BMP     Acute on chronic hypoxic respiratory failure  -Escalated to high flow NC this afternoon  -CXR with no significant findings  -Obtain CT Chest    Dermatitis  -Continue Solu-Medrol, Benadryl prn and famotidine     Permanent atrial fibrillation on Coumadin  Supratherapeutic INR  -Currently supratherapeutic  INR   -Pharmacy to assist with dosing    Type 2 diabetes mellitus  -Accu-Cheks ACHS with sliding scale scale insulin    Hypothyroidism  -Continue levothyroxine    LOIS  -CPAP at night    Addendum: Patient became hypoxic and unresponsive when supine for CT imaging; scans subsequently aborted. Patient oxygenating well on NRB. Will place on BiPAP while sleeping tonight. Patient states he is a DNR. He also consents to initiating dialysis tomorrow. He requests his brother, Julien, is is medical decision maker - this was confirmed with his wife, Estrella, via phone, as well as Julien who is at bedside.     Expected Discharge Location and Transportation: TBD  Expected Discharge   Expected Discharge Date: 12/23/2023; Expected Discharge Time:      DVT prophylaxis:  Medical DVT prophylaxis orders are present.     AM-PAC 6 Clicks Score (PT): 21 (12/20/23 5730)    CODE STATUS:   Code Status and Medical Interventions:   Ordered at: 12/19/23 0952     Code Status (Patient has no pulse and is not breathing):    CPR (Attempt to Resuscitate)     Medical Interventions (Patient has pulse or is breathing):    Full Support       Kiran Martin, DO  12/20/23

## 2023-12-20 NOTE — PROGRESS NOTES
"   LOS: 1 day    Patient Care Team:  Gunner Morales III, MD as PCP - General (Family Medicine)  Gumaro Fitzpatrick MD as Consulting Physician (Nephrology)  Esteban Fitzgerald MD as Consulting Physician (Cardiology)  Chucho Ayala MD as Consulting Physician (Cardiology)    Subjective     Urine output has been an uric overnight.  Patient had bladder scan which did not show any retained urine.  Primary team had Stephenson catheter replaced.  Patient initially with draining of clear urine.  Then developed gunner hematuria.  Nurse working with catheter while I was at bedside.  Patient with considerable pain.  Catheter was able to flush.  Aspirated thrombus.  Patient continued with significant pain and gunner blood in the catheter.  Catheter was removed.While at bedside sats dropped to 81%.  Respiratory called to evaluate.    Objective     Vital Signs:  Blood pressure 101/69, pulse 70, temperature 98.3 °F (36.8 °C), temperature source Oral, resp. rate 18, height 177.8 cm (70\"), weight 108 kg (239 lb), SpO2 96%.      Intake/Output Summary (Last 24 hours) at 12/20/2023 1618  Last data filed at 12/20/2023 1141  Gross per 24 hour   Intake 850 ml   Output 60 ml   Net 790 ml        12/19 0701 - 12/20 0700  In: 550 [P.O.:550]  Out: -     Physical Exam:        GENERAL:  WD elderly WMNAD  NEURO: Awake and alert, oriented. No focal deficit  PSYCHIATRIC: NMA. Cooperative with PE  EYE: PE, no icterus, no conjunctivitis  ENT: ommm, dentition intact,  Hearing intact  NECK: Supple , No JVD discernable,  Trachea midline  CV: + Edema, RRR  LUNGS:  Quiet,  Mildly labored resp.  Symmetrical expansion  ABDOMEN: Protuberant with mild distention.  Nontender to palpation.  : + Stephenson, no palp bladder  SKIN: Warm and dry without rash.  + ICD site      Labs:  Results from last 7 days   Lab Units 12/20/23  0805 12/19/23  0316   WBC 10*3/mm3 3.90 3.58   HEMOGLOBIN g/dL 11.8* 12.0*   PLATELETS 10*3/mm3 198 212     Results from last 7 days "   Lab Units 12/20/23  0805 12/19/23 2019 12/19/23  1531 12/19/23  1148 12/19/23  0841 12/19/23  0316   SODIUM mmol/L 125* 127* 126* 127* 126* 126*   POTASSIUM mmol/L 4.1  --  3.9 4.2 4.6 3.5   CHLORIDE mmol/L 82*  --  81* 82* 82* 83*   CO2 mmol/L 23.0  --  28.0 28.0 29.0 28.0   BUN mg/dL 92*  --  78* 73* 70* 63*   CREATININE mg/dL 2.94*  --  2.46* 2.40* 2.27* 2.15*   CALCIUM mg/dL 8.5*  --  9.1 8.8 9.1 8.8   PHOSPHORUS mg/dL  --   --   --   --   --  4.5   MAGNESIUM mg/dL  --   --   --   --   --  2.9*   ALBUMIN g/dL  --   --   --   --   --  3.4*     Results from last 7 days   Lab Units 12/19/23  0316   ALK PHOS U/L 85   BILIRUBIN mg/dL 1.1   ALT (SGPT) U/L 13   AST (SGOT) U/L 19                   Estimated Creatinine Clearance: 24.2 mL/min (A) (by C-G formula based on SCr of 2.94 mg/dL (H)).         A/P:      QUE:   Creatinine trending up to 2.9. Significant decrease in urine output. Blood pressure low.      Urine Na <20, Urine osm 320 UA trace ketone and trace protein consistent with prerenal azotemia.For now continue supportive care.  Strict I's and O's.  Monitor renal function closely.  Hopefully can avoid dialysis.    Hematuria:  Likely due to Stephenson trauma with supratherapeutic INR.  Patient with clots in Stephenson. Stephenson removed due to severe pain. May need urology to evaluate.    CKD stage III:  Last known stable cr btw 1.4-1.6 mg/dl.    Hypotension:  Blood pressure low overnight in the 80s/70s with rising creatinine.  Patient getting amiodarone and spironolactone.Will hold spironolactone for now.     Cardiorenal syndrome:  Hx of CHF w EF 35% elevated RSVP in the past. Dependent edema noted.  Need strict I's and O's for volume management.     Hyponatremia:   Sodium level stable but low today at 125. urine Na <20 Urine osm 320. Urine studies not reliable in the setting of chronic HF. Suspect hyponatremia due to QUE on CKD, excessive fluid intake and total body volume overload.  Worsening overnight on spironolactone  and intermittent Bumex.  For now continue free water restriction.  All fluids isotonic.     ICD site hematoma:  Developed allergic reaction after placing tyRex following generator change 12/14/23. Received Steroids.      Dependent edema:  Chronic in the setting of CHF     Afib/Vtach:  Presented w syncope at OSH.      Lactic acidosis:  Worsening. Sepsis related?. Bp stable. Not requiring inotropic support. Monitor for now     Supratheraputic INR:   Coumadin on hold.     High risk and complexity patient.      Mason Merritt MD  12/20/23  16:18 EST

## 2023-12-21 NOTE — PROGRESS NOTES
"Pharmacy Consult  -  Warfarin  Omero Holland is a  83 y.o. male   Height - 177.8 cm (70\")  Weight - 109 kg (239 lb 6.4 oz)    Consulting Service - hospitalist  Indication - atrial fibrillation  Goal INR - 2-3  Home Regimen:   - warfarin 6 mg daily  Bridge Therapy: No     Drug-Drug Interactions with current regimen:             Aspirin, levothyroxine, torsemide (PTA)             Amiodarone - initiated 12/19. Amiodarone may enhance the anticoagulant effect of Vitamin K Antagonists. Amiodarone may increase the serum concentration of Vitamin K Antagonists.             Zosyn - Penicillins may enhance the anticoagulant effect of Vitamin K Antagonists.  Warfarin Dosing During Admission:  Date  12/19 12/20 12/21         INR  5.47 6.27 7.81         Dose  Hold  Hold  Hold            Education Provided:    Discharge Follow up:  Following Provider -  Follow up time range or appointment - 2- 4 days post discharge.    Labs:  Results from last 7 days   Lab Units 12/19/23  0316   INR  5.47*   APTT seconds 43.8*   HEMOGLOBIN g/dL 12.0*   HEMATOCRIT % 38.7   PLATELETS 10*3/mm3 212     Results from last 7 days   Lab Units 12/19/23  0316   SODIUM mmol/L 126*   POTASSIUM mmol/L 3.5   CHLORIDE mmol/L 83*   CO2 mmol/L 28.0   BUN mg/dL 63*   CREATININE mg/dL 2.15*   CALCIUM mg/dL 8.8   BILIRUBIN mg/dL 1.1   ALK PHOS U/L 85   ALT (SGPT) U/L 13   AST (SGOT) U/L 19   GLUCOSE mg/dL 269*     Current dietary intake:   Diet Order   Procedures    NPO Diet NPO Type: Strict NPO       Assessment/Plan:   1. INR is currently SUPRAtherapeutic.  New start to amiodarone 12/19. Continue to hold warfarin for now.  2. Daily PT-INR labs have been ordered.  3. Pharmacy will adjust Mr. Holland's warfarin dose as needed based on daily INR result.    Thank you  Jacob Bolaños [facility]  12/19/2023  07:22 EST            "

## 2023-12-21 NOTE — PROGRESS NOTES
[facility] Medicine Services  PROGRESS NOTE    Patient Name: Omero Holland  : 1940  MRN: [id number]    Date of Admission: 2023  Primary Care Physician: Dimitrios Morales III, MD    Subjective   Subjective     CC:  Syncope    HPI:  Patient with worsening discomfort and hypoxia overnight.  He states that he does not want to pursue dialysis and would like to transition to comfort care/hospice.      Objective   Objective     Vital Signs:   Temp:  [95.3 °F (35.2 °C)-98.1 °F (36.7 °C)] 98.1 °F (36.7 °C)  Heart Rate:  [69-72] 70  Resp:  [16-20] 16  BP: ()/(59-77) 97/65  Flow (L/min):  [40-55] 55     Physical Exam:  General appearance: alert, awake, oriented, moderately distressed  Cardiovascular: Irregularly irregular, systolic murmur, radial pulse full 2/4 BL   Respiratory: Decreased basilar breath sounds, worse rhonchi, oxygenating 88% on nonrebreather  Abdomen: distended, non-tender, no organomegaly, bowel sounds normoactive    Neuro/CNS: alert and oriented x3, normal speech  Skin: raised non tender lesion on L chest wall; scattered erythema across chest     Results Reviewed:  LAB RESULTS:      Lab 23  0422 23  0805 23  1531 23  1148 23  0531 23  0316   WBC 4.76  --  3.90  --   --   --   --  3.58   HEMOGLOBIN 12.4*  --  11.8*  --   --   --   --  12.0*   HEMATOCRIT 39.0  --  37.1*  --   --   --   --  38.7   PLATELETS 201  --  198  --   --   --   --  212   NEUTROS ABS 3.95  --  3.05  --   --   --   --  2.89   IMMATURE GRANS (ABS) 0.01  --  0.01  --   --   --   --  0.01   LYMPHS ABS 0.32*  --  0.40*  --   --   --   --  0.49*   MONOS ABS 0.48  --  0.44  --   --   --   --  0.17   EOS ABS 0.00  --  0.00  --   --   --   --  0.01   MCV 79.4  --  77.9*  --   --   --   --  81.3   LACTATE 6.0* 5.4*  --  4.3* 5.0* 5.8*   < > 4.0*   PROTIME 66.3*  --  55.8*  --   --   --   --  50.1*   APTT  --   --   --   --    --   --   --  43.8*    < > = values in this interval not displayed.         Lab 12/21/23  0818 12/21/23 0422 12/20/23 2002 12/20/23 0805 12/19/23 2019 12/19/23  1531 12/19/23  1148 12/19/23  0841 12/19/23  0316   SODIUM 121* 124* 122*  122* 125* 127* 126* 127*   < > 126*   POTASSIUM  --  5.3* 4.8 4.1  --  3.9 4.2   < > 3.5   CHLORIDE  --  79* 78* 82*  --  81* 82*   < > 83*   CO2  --  23.0 24.0 23.0  --  28.0 28.0   < > 28.0   ANION GAP  --  22.0* 20.0* 20.0*  --  17.0* 17.0*   < > 15.0   BUN  --  112* 105* 92*  --  78* 73*   < > 63*   CREATININE  --  3.64* 3.16* 2.94*  --  2.46* 2.40*   < > 2.15*   EGFR  --  16.0* 19.0* 20.7*  --  25.7* 26.4*   < > 30.2*   GLUCOSE  --  145* 163* 174*  --  250* 290*   < > 269*   CALCIUM  --  8.4* 8.5* 8.5*  --  9.1 8.8   < > 8.8   MAGNESIUM  --   --   --   --   --   --   --   --  2.9*   PHOSPHORUS  --   --   --   --   --   --   --   --  4.5   HEMOGLOBIN A1C  --   --   --   --   --   --   --   --  9.80*   TSH  --   --   --   --   --   --   --   --  3.150    < > = values in this interval not displayed.         Lab 12/19/23 0316   TOTAL PROTEIN 7.3   ALBUMIN 3.4*   GLOBULIN 3.9   ALT (SGPT) 13   AST (SGOT) 19   BILIRUBIN 1.1   ALK PHOS 85         Lab 12/21/23 0422 12/20/23  0805 12/19/23  0531 12/19/23  0316   PROBNP  --   --   --  20,530.0*   HSTROP T  --   --  64* 67*   PROTIME 66.3* 55.8*  --  50.1*   INR 7.81* 6.27*  --  5.47*                 Brief Urine Lab Results  (Last result in the past 365 days)        Color   Clarity   Blood   Leuk Est   Nitrite   Protein   CREAT   Urine HCG        12/19/23 0645             112.8                 Microbiology Results Abnormal       None            XR Chest 1 View    Result Date: 12/21/2023  XR CHEST 1 VW Date of Exam: 12/21/2023 6:18 AM CST Indication: ?PN Comparison: Chest x-ray 12/20/2023 Findings: The heart is stable in size. There is a cardiac pacemaker. Left basilar airspace disease and underlying effusion does not appear  significantly changed. No evidence for pneumothorax.     Impression: Impression: Stable exam with left basilar airspace disease and mild effusion. Electronically Signed: Afia Alva MD  12/21/2023 6:44 AM CST  Workstation ID: [id number]    US Renal Limited    Result Date: 12/21/2023  US RENAL LIMITED Date of Exam: 12/21/2023 5:00 AM EST Indication: renal failure. Comparison: No comparisons available. Technique: Grayscale and color Doppler ultrasound evaluation of the kidneys and urinary bladder was performed. Findings: The right kidney measures 10.4 cm and the left kidney measures 10.4 cm. Kidney echogenicity appears within normal limits. Diffuse bilateral renal cortical atrophy. There is no solid kidney mass.  No echogenic shadowing stone.  No hydronephrosis. Bladder decompressed not well evaluated     Impression: Impression: Bilateral renal cortical atrophy. Otherwise unremarkable renal ultrasound Electronically Signed: Mateo Roberto MD  12/21/2023 7:44 AM EST  Workstation ID: [id number]    XR Chest 1 View    Result Date: 12/20/2023  XR CHEST 1 VW Date of Exam: 12/20/2023 3:09 PM EST Indication: acute hypoxia Comparison: 12/19/2023 Findings: There is cardiomegaly. Small left pleural effusion with mild left basilar opacities. The right lung is clear. The right costophrenic angle is sharp. There is a multilead left pacer device     Impression: Impression: Cardiomegaly Small left pleural effusion Mild left basilar atelectasis or infiltrate Electronically Signed: Mateo Roberto MD  12/20/2023 3:37 PM EST  Workstation ID: [id number]     Results for orders placed during the hospital encounter of 12/19/23    Adult Transthoracic Echo Complete w/ Color, Spectral and Contrast if necessary per protocol    Interpretation Summary    Left ventricular systolic function is severely decreased. Left ventricular ejection fraction appears to be 21 - 25%.    The left ventricular cavity is moderately dilated.    Left ventricular wall  thickness is consistent with moderate concentric hypertrophy.    Left ventricular diastolic function is consistent with (grade Ia w/high LAP) impaired relaxation.    Moderately reduced right ventricular systolic function noted.    The right ventricular cavity is mild to moderately dilated.    The left atrial cavity is severely dilated.    The right atrial cavity is moderately  dilated.    There is severe calcification of the aortic valve mainly affecting the non-coronary, left coronary and right coronary cusp(s).    Moderate mitral valve regurgitation is present.    Severe tricuspid valve regurgitation is present.    Estimated right ventricular systolic pressure from tricuspid regurgitation is markedly elevated (>55 mmHg).    Severe pulmonary hypertension is present.    Ejection fraction reduced compared to 2021 study.      Current medications:  Scheduled Meds:amiodarone, 400 mg, Oral, TID   Followed by  [START ON 12/27/2023] amiodarone, 400 mg, Oral, Q24H   Followed by  [START ON 1/26/2024] amiodarone, 200 mg, Oral, Q24H  aspirin, 81 mg, Oral, Daily  DULoxetine, 60 mg, Oral, Daily  famotidine, 20 mg, Oral, BID AC  gabapentin, 300 mg, Oral, Q8H  HYDROmorphone, 0.5 mg, Intravenous, Once  levothyroxine, 25 mcg, Oral, Q AM  methylPREDNISolone sodium succinate, 80 mg, Intravenous, Q8H  ondansetron, 4 mg, Intravenous, Once  senna-docusate sodium, 2 tablet, Oral, BID  sodium chloride, 10 mL, Intravenous, Q12H  warfarin (COUMADIN) (dosing per levels), , Does not apply, Daily      Continuous Infusions:Pharmacy to dose warfarin,       PRN Meds:.  acetaminophen **OR** acetaminophen **OR** acetaminophen    senna-docusate sodium **AND** polyethylene glycol **AND** bisacodyl **AND** bisacodyl    HYDROmorphone    melatonin    midazolam    ondansetron    Pharmacy to dose warfarin    sodium chloride    sodium chloride    Assessment & Plan   Assessment & Plan     Active Hospital Problems    Diagnosis  POA    **Syncope [R55]  Yes     Type 2 diabetes mellitus with chronic kidney disease, without long-term current use of insulin [E11.22]  Yes    Biventricular automatic implantable cardioverter defibrillator in situ [Z95.810]  Yes    PAF (paroxysmal atrial fibrillation) [I48.0]  Yes    Cardiomyopathy [I42.9]  Yes    Hypertension [I10]  Yes    Dyslipidemia [E78.5]  Yes    Chronic systolic congestive heart failure [I50.22]  Yes    LOIS (obstructive sleep apnea) [G47.33]  Yes    Morbidly obese [E66.01]  Yes      Resolved Hospital Problems   No resolved problems to display.        Brief Hospital Course to date:  Omero Holland is a 83 y.o. male with past medical history significant for atrial fibrillation on Coumadin, ischemic cardiomyopathy, CAD, type 2 diabetes mellitus, hypertension, hyperlipidemia, CKD stage III, obesity, chronic hypoxia on 2 L nasal cannula, was admitted as a transfer from [facility] for syncope and found to have concerns for V. tach.  Patient subsequently noted to have a hematoma associated with BiV ICD, as well as renal failure.  Patient developed worsening hypoxia, lactic acidosis overnight.  Request transition to hospice/comfort care.     QUE on CKD stage III  Hyponatremia  Lactic acidosis   -Nephrology consulted; no diuresis with Bumex   -N.p.o. after midnight in anticipation for dialysis tomorrow  -Urology consulted for gross hematuria and pain on Stephenson placement; CT A/P pending   -Repeat BMP   -Patient wishes to pursue comfort care, palliative care consulted  -No further labs  -Anticipate transition to inpatient hospice tomorrow after family meeting     Acute on chronic hypoxic respiratory failure  -Escalated to high flow NC this afternoon  -CXR with no significant findings  -Obtain CT Chest  -BiPAP for comfort; optimize comfort medications    Syncope  VTach  Ischemic Cardiomyopathy  Chronic systolic heart failure  Hematoma overlying BiV ICD generator s/p change 12/4   -Cardiology consulted   -No  intervention on hematoma, await INR to normalize      Dermatitis  -Improved  -Continue Solu-Medrol, Benadryl prn and famotidine for any associated comfort if p.o. tolerated     Permanent atrial fibrillation on Coumadin  Supratherapeutic INR  -Currently supratherapeutic INR   -Pharmacy to assist with dosing     Type 2 diabetes mellitus     Hypothyroidism     LOIS    Patient demonstrated adequate insight into his medical condition upon exam this morning.  Brother at bedside.  Patient expresses that he does not wish to proceed with hemodialysis and would like to pursue hospice/comfort care.  Palliative care and hospice care consulted, comfort orders placed, anticipate transition to inpatient hospice tomorrow.    Expected Discharge Location and Transportation: Inpatient hospice  Expected Discharge   Expected Discharge Date: 12/22/2023; Expected Discharge Time:      DVT prophylaxis:  Medical DVT prophylaxis orders are present.     AM-PAC 6 Clicks Score (PT): 14 (12/21/23 0800)    CODE STATUS:   Code Status and Medical Interventions:   Ordered at: 12/21/23 0923     Level Of Support Discussed With:    Patient     Code Status (Patient has no pulse and is not breathing):    No CPR (Do Not Attempt to Resuscitate)     Medical Interventions (Patient has pulse or is breathing):    Comfort Measures       Kiran Martin, DO  12/21/23

## 2023-12-21 NOTE — PROGRESS NOTES
"Continued Stay Note  [facility]     Patient Name: Omero Holland  MRN: [id number]  Today's Date: 12/21/2023    Admit Date: 12/19/2023    Plan: Inpatient  hospice   Discharge Plan       Row Name 12/21/23 1536       Plan    Plan Inpatient  hospice    Plan Comments Visit made to pt at 1230, pt's wife and daughter present. Pt alert and restless, saying is ready to go \"home\". Pt  has recently received a dose of dilaudid IV. Pt on high flow nasal cannula. Teaching done on hospice inpatient, pt's wife and daughter agreeable to inpatient hospice. Informed family the inpatient hospice admission will be tomorrow, this writer will talk with the inpatient team regarding an admission time. Discussed pt's restlessness with pt's staff nurse Laci Agureo stated will call Dr. Ozuna regarding medication for pt's restlessness. Discussed pt's condition with the inpatient hospice team, inpatient will admit pt tomorrow at 1130.Second visit made to pt at 1500, wife, daughters and grandchildren present. Pt currently on NPPV/ NIV, eyes closed, appears comfortable, appears to be sleeping, Informed family and staff nurse Laci that inpatient will meet with the family tomorrow, 12/22, to admit pt to inpatient hospice. Will continue to follow. Please call [phone] if can be of further assistance.                   Discharge Codes    No documentation.                 Expected Discharge Date and Time       Expected Discharge Date Expected Discharge Time    Dec 23, 2023               Yessica Lay RN    "

## 2023-12-21 NOTE — PLAN OF CARE
Problem: Syncope  Goal: Absence of Syncopal Symptoms  Intervention: Manage Effect of Syncopal Symptoms  Recent Flowsheet Documentation  Taken 12/21/2023 1639 by Michelle Salazar RN  Supportive Measures:   active listening utilized   verbalization of feelings encouraged   decision-making supported   goal-setting facilitated     Problem: Adjustment to Illness (Heart Failure)  Goal: Optimal Coping  Intervention: Support Psychosocial Response  Recent Flowsheet Documentation  Taken 12/21/2023 1639 by Michelle Salazar RN  Life Transition/Adjustment:   palliative care initiated   palliative care discussed   end-of-life care initiated  Supportive Measures:   active listening utilized   verbalization of feelings encouraged   decision-making supported   goal-setting facilitated  Family/Support System Care:   involvement promoted   presence promoted   support provided   Goal Outcome Evaluation:  Plan of Care Reviewed With: patient, son, family        Progress: declining  Outcome Evaluation: nw palliative consult for w/d of interventions and hospice referal discussion. Pt wants to stop dialysis and be comfortable. He is currently on 40% HFNC/40L and lips are cyantoic.  Defibrillator turned off. Pt is dyspneic, having pain all over, and very nauseated.  Pt was given iv dilaudid and zofran.  PRn versed added for restlessness and agitation.  Plans to admit to inpt hospice if he survives the night.  Pt's son, dtr in law, and grand-dtr at the bedside.  Pt had requested pt's brother Julien to make decisions if he is unable.  Pt is comfort measures.  Palliative IDT: Rn, SW, MD COLE,   After hours# [phone]

## 2023-12-21 NOTE — PROGRESS NOTES
SW left VM for pt wife re setting up a time to meet with inpatient hospice tomorrow 12/22.  Will meet with family at 1130 12/22.

## 2023-12-21 NOTE — CONSULTS
[facility]   Consult Note    Patient Name: Omero Holland  : 1940  MRN: [id number]  Primary Care Physician:  Dimitrios Morales III, MD  Referring Physician: Julio Reid MD  Date of admission: 2023    Consults  Subjective   Subjective     Reason for Consult/ Chief Complaint: Patient with low urine output and gross hematuria and multiple cardiopulmonary issues    History of Present Illness  Omero Holland is a 83 y.o. male urologic history significant for having 40 radiation treatments for prostate carcinoma a year and a half ago.  He is typically followed by Dr. Fuentes Love in [place] and had a recent visit there with a very low PSA.  He was admitted here for evaluation of fatigue and potential changing of his pacemaker defibrillator.  He initially was responding to diuretics at home.  He states that on that therapy he was having excellent urine flow.  After admission here he had a Stephenson catheter placed to monitor urine output.  He then developed some gross hematuria as his urine output was had declined.  Earlier today his catheter was removed.  Currently his residuals were minimal.  They did perform manual irrigation and got only small amount of clots out.  Apparently was also supratherapeutic on his anticoagulation therapy.  Currently he is voiding very small amount of bloody urine.  Earlier today had ordered a CT scan of the abdomen for assessment of his upper tracts.  While in the radiology department.  He became syncopal and that was aborted and he was brought back to the room.  Currently is sitting up.  He is brother is at bedside.  Patient and brother ambulate his past medical history.  I suggest that we obtain a renal ultrasound at bedside tomorrow.  Will have them check a postvoid residual tonight.    Review of Systems     Personal History     Past Medical History:   Diagnosis Date    A-fib     Arthritis     Back pain     Cardiomyopathy     Non-ischemic     Coronary artery disease     2 stents    Diabetes mellitus     dx 3- 4 years ago- checks fsbs daily    Dizziness     Dyslipidemia     Hyperlipidemia     Hypertension     H/o    Kidney disease     HX- sees a nephrologist    Melanoma     Obesity     LOIS (obstructive sleep apnea)     cpap    PAF (paroxysmal atrial fibrillation) 08/21/2017    /atrial flutter    Palpitations     Tachy Palpitations    Psoriatic arthritis     Systolic heart failure     Chronic class II    Wears glasses     reading       Past Surgical History:   Procedure Laterality Date    CARDIAC ABLATION      CARDIAC CATHETERIZATION Left 2007    2 stents    CARDIAC ELECTROPHYSIOLOGY PROCEDURE N/A 9/27/2017    Procedure: Implant ICD - bi ventricular;  Surgeon: Evan Leroy DO;  Location:  [facility];  Service:     CARDIAC ELECTROPHYSIOLOGY PROCEDURE N/A 12/12/2019    Procedure: AV node ablation- hold coumadin one day;  Surgeon: Laci Wiley MD;  Location:  [facility];  Service: Cardiovascular    COLONOSCOPY      > 10 years ago    GALLBLADDER SURGERY      HIP SURGERY  2020    ICD GENERATOR REPLACEMENT N/A 12/4/2023    Procedure: ICD Generator Change - Yh-Hugduqqalif-HWP-Hold Eliquis for 2 days;  Surgeon: Esteban Fitzgerald MD;  Location:  [facility];  Service: Cardiovascular;  Laterality: N/A;  Hx AVNA. Battery @ [facility] on 10/24/2023.    LIVER BIOPSY      x 3    OTHER SURGICAL HISTORY      Melanoma removal of back     PROSTATE BIOPSY      REPLACEMENT TOTAL KNEE BILATERAL Bilateral        Family History: family history includes Cancer in his mother; Heart attack in his father. Otherwise pertinent FHx was reviewed and not pertinent to current issue.    Social History:  reports that he has never smoked. He has never used smokeless tobacco. He reports that he does not drink alcohol and does not use drugs.    Home Medications:   DULoxetine, Insulin Degludec, aspirin, atorvastatin, carvedilol, cholecalciferol,  dapagliflozin Propanediol, docusate sodium, gabapentin, levothyroxine, magnesium oxide, nitroglycerin, spironolactone, torsemide, and warfarin    Allergies:  Allergies   Allergen Reactions    Ketamine Confusion    Codeine Other (See Comments)     Stomach pain       Objective    Objective     Vitals:  Temp:  [97 °F (36.1 °C)-98.3 °F (36.8 °C)] 98.3 °F (36.8 °C)  Heart Rate:  [69-82] 70  Resp:  [16-18] 18  BP: ()/(59-86) 104/59  Flow (L/min):  [2-40] 40    Physical Exam    Result Review    Result Review:  I have personally reviewed the results from the time of this admission to 12/20/2023 20:50 EST and agree with these findings:  []  Laboratory list / accordion  []  Microbiology  []  Radiology  []  EKG/Telemetry   []  Cardiology/Vascular   []  Pathology  []  Old records  []  Other:  Most notable findings include: Lab values and history      Assessment & Plan   Assessment / Plan     Brief Patient Summary:  Omero Holland is a 83 y.o. male who has previous history of radiation therapy for prostate carcinoma.  He does have gross hematuria and this may be a combination of previous radiation as well as catheter trauma and anticoagulation therapy.  Plan as above    Active Hospital Problems:  Active Hospital Problems    Diagnosis     **Syncope     Type 2 diabetes mellitus with chronic kidney disease, without long-term current use of insulin     Biventricular automatic implantable cardioverter defibrillator in situ     PAF (paroxysmal atrial fibrillation)     Cardiomyopathy     Hypertension     Dyslipidemia     Chronic systolic congestive heart failure     LOIS (obstructive sleep apnea)     Morbidly obese      Plan:       Fuentes Almonte MD

## 2023-12-21 NOTE — DISCHARGE PLACEMENT REQUEST
"Omero Blanc (83 y.o. Male)       Date of Birth   1940    Social Security Number       Address   [address] [place] [address]    Home Phone   [phone]    MRN   [id number]       Restorationism   Presybeterian    Marital Status                               Admission Date   12/19/23    Admission Type   Urgent    Admitting Provider   Kiran Martin DO    Attending Provider   Kiran Martin DO    Department, Room/Bed   [facility] 6A, N620/1       Discharge Date       Discharge Disposition       Discharge Destination                                 Attending Provider: Kiran Martin DO    Allergies: Ketamine, Codeine    Isolation: None   Infection: None   Code Status: No CPR    Ht: 177.8 cm (70\")   Wt: 110 kg (242 lb)    Admission Cmt: None   Principal Problem: Syncope [R55]                   Active Insurance as of 12/19/2023       Primary Coverage       Payor Plan Insurance Group Employer/Plan Group    MEDICARE MEDICARE A & B        Payor Plan Address Payor Plan Phone Number Payor Plan Fax Number Effective Dates    [address] [phone]  3/1/2001 - None Entered    [facility] [address]         Subscriber Name Subscriber Birth Date Member ID       OMERO BLANC MONTSE 1940 [id number]               Secondary Coverage       Payor Plan Insurance Group Employer/Plan Group    MISC MC SUP   MISC MC SUP              [id number]       Coverage Address Coverage Phone Number Coverage Fax Number Effective Dates    [address] [facility] [phone]  12/13/2023 - None Entered    [facility] [address]         Subscriber Name Subscriber Birth Date Member ID       OMERO BLANC MONTSE 1940 [id number]                     Emergency Contacts        (Rel.) Home Phone Work Phone Mobile Phone    Estrella Blanc (Spouse) [phone] -- --    SkylerJulien (POA) (Brother) -- -- [phone]              Emergency Contact Information       Name Relation Home Work Mobile    " "Estrella Holland Spouse [phone]      Julien Holland (POA) Brother   [phone]          Insurance Information                  MEDICARE/MEDICARE A & B Phone: [phone]    Subscriber: Omero Holland Subscriber#: [id number]    Group#: -- Precert#: --        [facility]  /[facility] SUP              Phone: [phone]    Subscriber: Omero Holland Subscriber#: [id number]    Group#: [id number] Precert#: --             History & Physical        Julio Reid MD at 23 0308              [facility] Medicine Services  HISTORY AND PHYSICAL    Patient Name: Omero Holland  : 1940  MRN: [id number]  Primary Care Physician: Dimitrios Morales III, MD  Date of admission: 2023    Subjective  Subjective     Chief Complaint:  Syncope     HPI:  Omero Holland is a 83 y.o. male with PMH significant for A-fib on Coumadin, nonischemic cardiomyopathy/systolic heart failure, CAD, DM 2, HTN, HLD, CKD 3, obesity, chronic respiratory failure on 2 LNC, who presented originally to [facility] with complaint of syncope.  He states that for the past several days he has had multiple episodes of syncope.  He states he is fully lost consciousness and woke up on the floor.  He states that prior to the event, he feels a \"blip\" in front of his face and then wakes up on the floor.  The episodes never last longer than a few seconds.  Of note, he did recently have generator upgrade for his BiV ICD on 2023.  Since that time, he has had diffuse pruritic red rash over his trunk and his bilateral groin.  Upon arrival to the OSH, interrogation of his BiV ICD noted episodes of V. tach.  He was started on amiodarone drip.  Decision was made to transfer to [facility] for higher level of care.  Upon arrival to [facility], he has no complaints.  He will be admitted to hospital medicine for further evaluation.        Review of Systems   Constitutional:  Positive for activity " change. Negative for appetite change, chills, diaphoresis, fatigue and fever.   HENT: Negative.  Negative for congestion, sinus pressure, sore throat and trouble swallowing.    Eyes: Negative.  Negative for visual disturbance.   Respiratory:  Positive for shortness of breath. Negative for cough, chest tightness and wheezing.    Cardiovascular:  Positive for palpitations and leg swelling. Negative for chest pain.   Gastrointestinal:  Negative for abdominal distention, abdominal pain, constipation, diarrhea, nausea and vomiting.   Endocrine: Negative.  Negative for polydipsia and polyphagia.   Genitourinary:  Negative for difficulty urinating, dysuria, frequency and urgency.   Musculoskeletal:  Positive for gait problem. Negative for arthralgias, back pain, joint swelling, myalgias and neck pain.   Skin:  Positive for rash and wound. Negative for color change.   Allergic/Immunologic: Negative.  Negative for immunocompromised state.   Neurological:  Positive for dizziness, syncope, weakness and light-headedness. Negative for seizures, facial asymmetry, speech difficulty, numbness and headaches.   Hematological: Negative.  Does not bruise/bleed easily.   Psychiatric/Behavioral:  Positive for confusion. The patient is not nervous/anxious.             Personal History     Past Medical History:   Diagnosis Date    A-fib     Arthritis     Back pain     Cardiomyopathy     Non-ischemic    Coronary artery disease     2 stents    Diabetes mellitus     dx 3- 4 years ago- checks fsbs daily    Dizziness     Dyslipidemia     Hyperlipidemia     Hypertension     H/o    Kidney disease     HX- sees a nephrologist    Melanoma     Obesity     LOIS (obstructive sleep apnea)     cpap    PAF (paroxysmal atrial fibrillation) 08/21/2017    /atrial flutter    Palpitations     Tachy Palpitations    Psoriatic arthritis     Systolic heart failure     Chronic class II    Wears glasses     reading         Oncology Problem List:  Adenocarcinoma of  prostate (04/15/2021; Status: Active)  Oncology/Hematology History       Past Surgical History:   Procedure Laterality Date    CARDIAC ABLATION      CARDIAC CATHETERIZATION Left 2007    2 stents    CARDIAC ELECTROPHYSIOLOGY PROCEDURE N/A 9/27/2017    Procedure: Implant ICD - bi ventricular;  Surgeon: Evan Leroy DO;  Location:  [facility];  Service:     CARDIAC ELECTROPHYSIOLOGY PROCEDURE N/A 12/12/2019    Procedure: AV node ablation- hold coumadin one day;  Surgeon: Laci Wiley MD;  Location:  [facility];  Service: Cardiovascular    COLONOSCOPY      > 10 years ago    GALLBLADDER SURGERY      HIP SURGERY  2020    ICD GENERATOR REPLACEMENT N/A 12/4/2023    Procedure: ICD Generator Change - Hb-Ezkmknlijrv-QRV-Hold Eliquis for 2 days;  Surgeon: Esteban Fitzgerald MD;  Location:  [facility];  Service: Cardiovascular;  Laterality: N/A;  Hx AVNA. Battery @ [facility] on 10/24/2023.    LIVER BIOPSY      x 3    OTHER SURGICAL HISTORY      Melanoma removal of back     PROSTATE BIOPSY      REPLACEMENT TOTAL KNEE BILATERAL Bilateral        Family History:  family history includes Cancer in his mother; Heart attack in his father.     Social History:  reports that he has never smoked. He has never used smokeless tobacco. He reports that he does not drink alcohol and does not use drugs.  Social History     Social History Narrative    Not on file       Medications:  DULoxetine, Insulin Degludec, aspirin, atorvastatin, carvedilol, cholecalciferol, dapagliflozin Propanediol, docusate sodium, gabapentin, levothyroxine, magnesium oxide, nitroglycerin, spironolactone, torsemide, and warfarin    Allergies   Allergen Reactions    Ketamine Confusion    Codeine Other (See Comments)     Stomach pain       Objective  Objective     Vital Signs:   Temp:  [96.7 °F (35.9 °C)] 96.7 °F (35.9 °C)  Heart Rate:  [100-120] 100  Resp:  [20] 20  BP: (104-110)/(77-82) 110/82  Flow (L/min):  [2]  2    Physical Exam   Constitutional: Awake, alert, no acute distress  Eyes: PERRLA, sclerae anicteric, no conjunctival injection  HENT: NCAT, mucous membranes moist  Neck: Supple, no thyromegaly, no lymphadenopathy, trachea midline  Respiratory: Clear to auscultation bilaterally, decreased bases, nonlabored respirations   Cardiovascular: tachycardic, no murmurs, rubs, or gallops, palpable pedal pulses bilaterally  Gastrointestinal: Positive bowel sounds, soft, nontender, distended  Musculoskeletal: mild bilateral ankle edema, no clubbing or cyanosis to extremities  Psychiatric: Appropriate affect, cooperative  Neurologic: Oriented x 3, strength symmetric in all extremities, Cranial Nerves grossly intact to confrontation, speech clear  Skin: diffuse red rash over trunk down to bilateral groin, surgical wound left upper chest CDI, firm hematoma noted      Result Review:  I have personally reviewed the results from the time of this admission to 12/19/2023 03:08 EST and agree with these findings:  []  Laboratory list / accordion  []  Microbiology  []  Radiology  [x]  EKG/Telemetry   []  Cardiology/Vascular   []  Pathology  []  Old records  []  Other:  Most notable findings include: OSH labs  WBC 8.3, RBC 4.9, Hgb 12.2, HCT 40.4,   PT/INR 56.3, 5.83  , K+ 3.3, chloride 83, CO2 31, anion gap 8, BUN 61, creatinine 2.22, GFR 29, glucose 299, CA 9.1, magnesium 2.8, bilirubin 1.3, AST 34, ALT 13, alk phos 101  HS troponin 83    LAB RESULTS:                              Brief Urine Lab Results       None          Microbiology Results (last 10 days)       ** No results found for the last 240 hours. **            No radiology results from the last 24 hrs    Results for orders placed in visit on 07/14/21    Adult Transthoracic Echo Complete W/ Cont if Necessary Per Protocol    Interpretation Summary  · Estimated left ventricular EF = 35% Estimated left ventricular EF was in agreement with the calculated left  ventricular EF. Left ventricular ejection fraction appears to be 36 - 40%.  · Left ventricular diastolic function is consistent with (grade Ia w/high LAP) impaired relaxation.  · Moderate to severe tricuspid valve regurgitation is present.  · Estimated right ventricular systolic pressure from tricuspid regurgitation is markedly elevated (>55 mmHg).  · The left ventricular cavity is mild to moderately dilated.  · Left ventricular wall thickness is consistent with mild concentric hypertrophy.  · Left ventricular mass index is increased.  · The right ventricular cavity is mild to moderately dilated.  · Mildly reduced right ventricular systolic function noted.  · The right atrial cavity is mild to moderately dilated.  · Moderate mitral valve regurgitation is present.  · Modetrate TR      Assessment & Plan  Assessment & Plan       Syncope    Cardiomyopathy    Hypertension    Dyslipidemia    Chronic systolic congestive heart failure    Morbidly obese    LOIS (obstructive sleep apnea)    PAF (paroxysmal atrial fibrillation)    Biventricular automatic implantable cardioverter defibrillator in situ    Type 2 diabetes mellitus with chronic kidney disease, without long-term current use of insulin    83 y.o. male with PMH significant for A-fib on Coumadin, nonischemic cardiomyopathy/systolic heart failure, CAD, DM 2, HTN, HLD, CKD 3, obesity, chronic respiratory failure on 2 LNC, who presented originally to [facility] with complaint of syncope who was found to have concerns for episodes of V. tach.    Syncope  V. tach  - Recent BiV ICD generator upgrade 12/4/2023  - Cardiology consult in the a.m., Dr. Fitzgerald  - Device interrogation performed at OSH (paperwork with chart)  - Amiodarone started at OSH, discontinued upon arrival secondary to prolonged QTc  - CT head negative per OSH report  - CBC, CMP, proBNP, troponin, magnesium, PT/INR, lactic acid, TSH pending  - UA pending    QUE on CKD 3  - OSH noted  creatinine 2.22, baseline~1.4-1.6  - Repeat CMP pending    Cardiomyopathy  Systolic heart failure  - S/p BiV ICD 2017 with generator change 12/4/2023  - Daily weight  - Continue torsemide and spironolactone    Rash  - Has been on Medrol Dosepak outpatient  - Solu-Medrol 120 given at OSH  - Benadryl and Pepcid given at OSH    Elevated troponin  - 83 at OSH, repeat pending  - EKG    Supratherapeutic INR  - Pharmacy to dose Coumadin    Hyponatremia  -   - Urine studies  - Repeat sodium pending    PAF  - Currently rate controlled  - On Coumadin, current INR at OSH 5.83, repeat PT/INR pending  - Pharmacy to dose  - Amiodarone drip started at OSH, did discontinue upon arrival secondary to prolonged QTc  - S/p AV node ablation  - S/p Bi V ICD Attivio original 2017, recent generator change 12/4/2023  - Continue carvedilol    Diabetes mellitus 2  - FSBG ACHS  - SS insulin  - Hemoglobin A1c  - On Farxiga and Tresiba, hold    Hypothyroid  - TSH pending  - Levothyroxine daily    Dyslipidemia  - Continue Lipitor    LOIS  - CPAP at at bedtime      DVT prophylaxis:  On Coumadin    CODE STATUS:         Expected Discharge  Expected discharge date/ time has not been documented.      This note has been completed as part of a split-shared workflow.     Signature: Electronically signed by COLE Rachel, 12/19/23, 3:45 AM EST.        Attending   Admission Attestation       I have performed an independent face-to-face diagnostic evaluation including performing an independent physical examination.  The documented plan of care above was reviewed and developed with the advanced practice clinician (APC).  I have updated the HPI as appropriate.    Brief HPI    Male with history of congestive heart failure, coronary disease, A-fib, ICD implanted pacemaker for nonischemic cardiomyopathy here with multiple episodes of syncope, interrogation showing vtach episodes. Generator replaced 2 weeks ago c/b hematoma and rash at  site.    Attending Physical Exam:  Temp:  [96.7 °F (35.9 °C)-97.7 °F (36.5 °C)] 97.7 °F (36.5 °C)  Heart Rate:  [] 106  Resp:  [20] 20  BP: ()/(77-82) 95/78  Flow (L/min):  [2] 2    AAOx3  Comfortable  Hematoma left chest, non tender.  Blanching rash on chest with urticaria and excoriations  Lungs with BL rales at bases  Heart grade 2 systolic murmur  Abd soft, nontender  2+ edema BL    Assessment and Plan:    Continue telemetry. Stop amio gtt due to worsening Qtc. No further events. Cards to see today.    QUE and hyponatremia. Suspect hypervolemia as he has worsening orthopnea and worse BNP. Start diuresis and monitor BMP.    Continue steroids.    Holding coumadin, INR supra therapeutic    See assessment and plan documented by APC above and updated/edited by me as appropriate.    Julio Reid MD  12/19/23                        Electronically signed by Julio Reid MD at 12/19/23 2209

## 2023-12-21 NOTE — PROGRESS NOTES
Pt. qualifies for Phase II Cardiac Rehab. Staff spoke with family and discussed benefits of exercise, program protocol, and educational material provided. Teach back verified. Patient's family refused cardiac rehab at this time due to patient not being proper candidate for program.

## 2023-12-21 NOTE — PROGRESS NOTES
"   LOS: 2 days    Patient Care Team:  Dimitrios Morales III, MD as PCP - General (Family Medicine)  Gumaro Fitzpatrick MD as Consulting Physician (Nephrology)  Esteban Fitzgerald MD as Consulting Physician (Cardiology)  Chucho Ayala MD as Consulting Physician (Cardiology)    Subjective     Worsening renal function. Patient decided for comfort measures and no dialysis. He is on HFNC    Objective     Vital Signs:  Blood pressure 98/70, pulse 70, temperature 97.5 °F (36.4 °C), temperature source Axillary, resp. rate 20, height 177.8 cm (70\"), weight 110 kg (242 lb), SpO2 93%.      Intake/Output Summary (Last 24 hours) at 12/21/2023 1340  Last data filed at 12/21/2023 1028  Gross per 24 hour   Intake 910 ml   Output 40 ml   Net 870 ml        12/20 0701 - 12/21 0700  In: 300 [I.V.:300]  Out: 60 [Urine:60]    Physical Exam:        GENERAL:  WD elderly WMNAD  NEURO: Awake and alert, oriented. No focal deficit  PSYCHIATRIC: NMA. Cooperative with PE  EYE: PE, no icterus, no conjunctivitis  ENT: ommm, dentition intact,  Hearing intact  NECK: Supple , No JVD discernable,  Trachea midline  CV: + Edema, RRR  LUNGS:  Quiet,  Mildly labored resp.  Symmetrical expansion  ABDOMEN: Protuberant with mild distention.  Nontender to palpation.  : + Stephenson, no palp bladder  SKIN: Warm and dry without rash.  + ICD site      Labs:  Results from last 7 days   Lab Units 12/21/23  0422 12/20/23  0805 12/19/23  0316   WBC 10*3/mm3 4.76 3.90 3.58   HEMOGLOBIN g/dL 12.4* 11.8* 12.0*   PLATELETS 10*3/mm3 201 198 212     Results from last 7 days   Lab Units 12/21/23  0818 12/21/23  0422 12/20/23 2002 12/20/23  0805 12/19/23  2019 12/19/23  1531 12/19/23  0841 12/19/23  0316   SODIUM mmol/L 121* 124* 122*  122* 125*   < > 126*   < > 126*   POTASSIUM mmol/L  --  5.3* 4.8 4.1  --  3.9   < > 3.5   CHLORIDE mmol/L  --  79* 78* 82*  --  81*   < > 83*   CO2 mmol/L  --  23.0 24.0 23.0  --  28.0   < > 28.0   BUN mg/dL  --  112* 105* 92*  --  " 78*   < > 63*   CREATININE mg/dL  --  3.64* 3.16* 2.94*  --  2.46*   < > 2.15*   CALCIUM mg/dL  --  8.4* 8.5* 8.5*  --  9.1   < > 8.8   PHOSPHORUS mg/dL  --   --   --   --   --   --   --  4.5   MAGNESIUM mg/dL  --   --   --   --   --   --   --  2.9*   ALBUMIN g/dL  --   --   --   --   --   --   --  3.4*    < > = values in this interval not displayed.     Results from last 7 days   Lab Units 12/19/23  0316   ALK PHOS U/L 85   BILIRUBIN mg/dL 1.1   ALT (SGPT) U/L 13   AST (SGOT) U/L 19                   Estimated Creatinine Clearance: 19.8 mL/min (A) (by C-G formula based on SCr of 3.64 mg/dL (H)).         A/P:      QUE:   Creatinine trending up to 2.9. Significant decrease in urine output. Blood pressure low.      Urine Na <20, Urine osm 320 UA trace ketone and trace protein consistent with prerenal azotemia.For now continue supportive care.  Strict I's and O's.  Monitor renal function closely.  Hopefully can avoid dialysis.    Hematuria:  Likely due to Stephenson trauma with supratherapeutic INR.  Patient with clots in Stephenson. Stephenson removed due to severe pain. May need urology to evaluate.    CKD stage III:  Last known stable cr btw 1.4-1.6 mg/dl.    Hypotension:  Blood pressure low overnight in the 80s/70s with rising creatinine.  Patient getting amiodarone and spironolactone.Will hold spironolactone for now.     Cardiorenal syndrome:  Hx of CHF w EF 35% elevated RSVP in the past. Dependent edema noted.  Need strict I's and O's for volume management.     Hyponatremia:   Sodium level stable but low today at 125. urine Na <20 Urine osm 320. Urine studies not reliable in the setting of chronic HF. Suspect hyponatremia due to QUE on CKD, excessive fluid intake and total body volume overload.  Worsening overnight on spironolactone and intermittent Bumex.  For now continue free water restriction.  All fluids isotonic.     ICD site hematoma:  Developed allergic reaction after placing tyRex following generator change 12/14/23.  Received Steroids.      Dependent edema:  Chronic in the setting of CHF     Afib/Vtach:  Presented w syncope at OSH.      Lactic acidosis:  Worsening. Sepsis related?. Bp stable. Not requiring inotropic support. Monitor for now     Supratheraputic INR:   Coumadin on hold.       Recs  Patient decided for no dialysis and wants to transition to comfort measures. Palliative team consulted.  D/C labs draw.           Alex Betancur MD  12/21/23  13:40 EST

## 2023-12-21 NOTE — PLAN OF CARE
Goal Outcome Evaluation:  Plan of Care Reviewed With: patient, spouse, family        Progress: declining  Outcome Evaluation: Patient A+Ox4 this am.  Reported to this writer infront of his brother that he does not want Dialysis and he wants to be comfortable and understands he will die.  Patient stated he is ready to move on.  Dr. Martin made aware, Palliative care ordered.  Plan is for the patient to be admitted to inpatient Hospice       12-22-23.  Numerous new orders.  Tele removed per order.   100% Bipap on and SAO2 86-92%.  IV Dilaudid and Versed given for patient comfort.  Patients family at the bedside and are aware of the pts condition.  Will continue with current POC.

## 2023-12-22 VITALS
SYSTOLIC BLOOD PRESSURE: 50 MMHG | OXYGEN SATURATION: 77 % | HEIGHT: 70 IN | TEMPERATURE: 98.9 F | HEART RATE: 70 BPM | DIASTOLIC BLOOD PRESSURE: 22 MMHG | RESPIRATION RATE: 18 BRPM | BODY MASS INDEX: 34.65 KG/M2 | WEIGHT: 242 LBS

## 2023-12-22 LAB
QT INTERVAL: 530 MS
QTC INTERVAL: 580 MS

## 2023-12-22 RX ADMIN — Medication 10 ML: at 01:44

## 2023-12-22 NOTE — SIGNIFICANT NOTE
Exam confirms with auscultation zero audible heart tones and zero audible respirations. Mr.Edward Sony Holland was pronounced dead at 2237.  MD notified by Patient's RN.    Katharina Romano RN  Clinical House Supervisor  12/21/2023 23:17 EST

## 2023-12-26 LAB
BACTERIA SPEC AEROBE CULT: NORMAL
BACTERIA SPEC AEROBE CULT: NORMAL

## 2024-01-03 NOTE — DISCHARGE SUMMARY
[facility] Medicine Services  DEATH SUMMARY    Patient Name: Omero Holland  : 1940  MRN: [id number]    Date of Admission: 2023  Date of Death:  2023  Time of Death:  22:37    Primary Care Physician: Dimitrios Morales III, MD    Consults       Date and Time Order Name Status Description    2023  8:24 AM Inpatient Palliative Care MD Consult Completed     2023  3:07 AM Inpatient Cardiology Consult Completed     2023  1:42 AM Inpatient Cardiology Consult Completed             Summary of Hospital Events   Presenting Problem: Syncope complicated by ventricular tachycardia, acute on chronic hypoxic respiratory failure and QUE    Active Hospital Problems    Diagnosis  POA    **Syncope [R55]  Yes    Type 2 diabetes mellitus with chronic kidney disease, without long-term current use of insulin [E11.22]  Yes    Biventricular automatic implantable cardioverter defibrillator in situ [Z95.810]  Yes    PAF (paroxysmal atrial fibrillation) [I48.0]  Yes    Cardiomyopathy [I42.9]  Yes    Hypertension [I10]  Yes    Dyslipidemia [E78.5]  Yes    Chronic systolic congestive heart failure [I50.22]  Yes    LOIS (obstructive sleep apnea) [G47.33]  Yes    Morbidly obese [E66.01]  Yes      Resolved Hospital Problems   No resolved problems to display.          Hospital Course:  Omero Holland is a 83 y.o. male with past medical history significant for atrial fibrillation on Coumadin, ischemic cardiomyopathy, CAD, type 2 diabetes mellitus, hypertension, hyperlipidemia, CKD stage III, obesity, chronic hypoxia on 2 L nasal cannula, was admitted as a transfer from [facility] for syncope and found to have concerns for V. Tach.  He had recently undergone biventricular ICD generator change on 23 and subsequently developed a hematoma across the device site.  On arrival patient noted to have an QUE on CKD stage III, acute on chronic systolic  heart failure, and acute on chronic respiratory failure.  Cardiology was consulted and initiated the patient on amiodarone.  His INR was noted to be supratherapeutic and cardiology elected to defer any intervention for the hematoma while allowing the INR to normalize.  Nephrology was consulted and initially attempted to diurese the patient but unfortunately patient's renal dysfunction worsened.  Overnight from -, patient developed lactic acidosis and worsening hypoxia.  It was determined that he would require hemodialysis.  After discussion of goals of care with the patient, he stated that he did not want hemodialysis and would like to pursue comfort care/hospice.  Hospice was consulted and assisted with comfort medications.  Patient  on 2023.     QUE on CKD stage III  Hyponatremia  Lactic acidosis   Acute on chronic hypoxic respiratory failure  Syncope  VTach  Ischemic Cardiomyopathy  Chronic systolic heart failure  Hematoma overlying BiV ICD s/p generator change    Dermatitis  Permanent atrial fibrillation on Coumadin  Supratherapeutic INR  Type 2 diabetes mellitus  Hypothyroidism  LOIS     Offcial Cause of Death and any directly related diagnoses:  Acute renal failure, acute on chronic systolic heart failure, acute on chronic hypoxic respiratory failure       Kiran Martin,   24
